# Patient Record
Sex: FEMALE | Race: BLACK OR AFRICAN AMERICAN | Employment: UNEMPLOYED | URBAN - METROPOLITAN AREA
[De-identification: names, ages, dates, MRNs, and addresses within clinical notes are randomized per-mention and may not be internally consistent; named-entity substitution may affect disease eponyms.]

---

## 2017-02-10 ENCOUNTER — PATIENT MESSAGE (OUTPATIENT)
Dept: OBGYN CLINIC | Age: 27
End: 2017-02-10

## 2017-03-31 ENCOUNTER — OFFICE VISIT (OUTPATIENT)
Dept: INTERNAL MEDICINE CLINIC | Age: 27
End: 2017-03-31

## 2017-03-31 VITALS
RESPIRATION RATE: 18 BRPM | SYSTOLIC BLOOD PRESSURE: 137 MMHG | TEMPERATURE: 98.9 F | BODY MASS INDEX: 40.37 KG/M2 | DIASTOLIC BLOOD PRESSURE: 76 MMHG | WEIGHT: 236.5 LBS | OXYGEN SATURATION: 100 % | HEART RATE: 85 BPM | HEIGHT: 64 IN

## 2017-03-31 DIAGNOSIS — R73.03 PREDIABETES: ICD-10-CM

## 2017-03-31 DIAGNOSIS — R09.81 NASAL CONGESTION: ICD-10-CM

## 2017-03-31 DIAGNOSIS — R03.0 ELEVATED BLOOD PRESSURE (NOT HYPERTENSION): ICD-10-CM

## 2017-03-31 DIAGNOSIS — R05.8 PRODUCTIVE COUGH: ICD-10-CM

## 2017-03-31 DIAGNOSIS — E66.01 OBESITY, MORBID, BMI 40.0-49.9 (HCC): ICD-10-CM

## 2017-03-31 DIAGNOSIS — R10.31 BILATERAL LOWER ABDOMINAL DISCOMFORT: ICD-10-CM

## 2017-03-31 DIAGNOSIS — J06.9 URI, ACUTE: Primary | ICD-10-CM

## 2017-03-31 DIAGNOSIS — R10.32 BILATERAL LOWER ABDOMINAL DISCOMFORT: ICD-10-CM

## 2017-03-31 DIAGNOSIS — R31.9 HEMATURIA: ICD-10-CM

## 2017-03-31 DIAGNOSIS — R04.0 MILD EPISTAXIS: ICD-10-CM

## 2017-03-31 DIAGNOSIS — Z30.018 ENCOUNTER FOR INITIAL PRESCRIPTION OF OTHER CONTRACEPTIVES: ICD-10-CM

## 2017-03-31 LAB
BILIRUB UR QL STRIP: NEGATIVE
GLUCOSE UR-MCNC: NEGATIVE MG/DL
HCG URINE, QL. (POC): NEGATIVE
KETONES P FAST UR STRIP-MCNC: NEGATIVE MG/DL
PH UR STRIP: 6.5 [PH] (ref 4.6–8)
PROT UR QL STRIP: NEGATIVE MG/DL
SP GR UR STRIP: 1.03 (ref 1–1.03)
UA UROBILINOGEN AMB POC: NORMAL (ref 0.2–1)
URINALYSIS CLARITY POC: CLEAR
URINALYSIS COLOR POC: NORMAL
URINE BLOOD POC: NORMAL
URINE LEUKOCYTES POC: NEGATIVE
URINE NITRITES POC: NEGATIVE
VALID INTERNAL CONTROL?: YES

## 2017-03-31 RX ORDER — PREDNISONE 10 MG/1
20 TABLET ORAL
Qty: 10 TAB | Refills: 0 | Status: SHIPPED | OUTPATIENT
Start: 2017-03-31 | End: 2017-07-13 | Stop reason: ALTCHOICE

## 2017-03-31 RX ORDER — ALBUTEROL SULFATE 0.63 MG/3ML
0.63 SOLUTION RESPIRATORY (INHALATION)
Qty: 1 VIAL | Refills: 0
Start: 2017-03-31 | End: 2017-03-31

## 2017-03-31 RX ORDER — ALBUTEROL SULFATE 90 UG/1
1 AEROSOL, METERED RESPIRATORY (INHALATION)
Qty: 1 INHALER | Refills: 0 | Status: SHIPPED | OUTPATIENT
Start: 2017-03-31

## 2017-03-31 NOTE — PROGRESS NOTES
Chief Complaint   Patient presents with    Hypertension     (RM5)    Cough    Sore Throat           Subjective:   Ernesto MontanoReggie 32 y.o.  female with a  past medical history reviewed see below. comes in today f/up HTN   C/o cough and st. cough dry/wet +/- yellow phelgm leads to a st and no cp with coughing  Had a baby since the last ov  C/o nose bleed started  with the could about a week ago and having a mild ha   No fever or chills. No back pain c/o lower abd pain last week no dyrusria no abd now   Trying to concieve       ROS: otherwise feeling generally well. All other systems reviewed and are negative      Current Outpatient Prescriptions   Medication Sig Dispense Refill    traMADol-acetaminophen (ULTRACET) 37.5-325 mg per tablet Take 1 Tab by mouth every six (6) hours as needed for Pain. Max Daily Amount: 4 Tabs. 12 Tab 0    ethinyl estradiol-etonogestrel (NUVARING) 0.12-0.015 mg/24 hr vaginal ring Place in vagina x 4 weeks, remove and replace the same day with new ring 1 Device 12    prenatal vit-calcium-iron-fa (PRENATAL PLUS WITH CALCIUM) 27 mg iron- 1 mg tab Take 1 Tab by mouth daily. Allergies   Allergen Reactions    Tomato Anaphylaxis    [place] Flavor Hives    Ibuprofen Other (comments)     Pt states med made her chest hurt.     Seafood [Shellfish Containing Products] Hives     Past Medical History:   Diagnosis Date    Anemia since birth    Headache     migraines    Hypertension since birth     Past Surgical History:   Procedure Laterality Date    HX  SECTION       Family History   Problem Relation Age of Onset   [address] [facility] Hypertension Mother     Diabetes Mother     Hypertension Sister     Hypertension Maternal Grandmother      Social History   Substance Use Topics    Smoking status: Never Smoker    Smokeless tobacco: Never Used    Alcohol use No          Objective:     Visit Vitals    /76 (BP 1 Location: Left arm, BP Patient Position: Sitting)    Pulse 85    Temp 98.9 °F (37.2 °C) (Oral)    Resp 18    Ht 5' 4\" (1.626 m)    Wt 236 lb 8 oz (107.3 kg)    LMP 03/14/2017    SpO2 100%    BMI 40.6 kg/m2     Gen: NAD, pleasant  HEENT: normal appearing head, nares patent, PERRLA, EOMI, oropharynx no erythema, no cervical lymphadenopathy neck supple   Cardio: RRR nl S1S2 no murmur  Lungs cough wet/dry sounding not productive  On exam  diminished breath sounds  wheeze no rales no rhonchi  ABD Soft no suprapubic or cva  Tenderness no guarding no rebounding limited by girth but no hsm  non distended + bowel sounds  Extremities: full ROM X 4 no clubbing no cyanosis  Neuro: no gross focal deficits noted, alert and orientated X 3  Psych.: well groomed no outward signs of depression. Assessment/Plan:   Shelbie Callejas was seen today for hypertension, cough and sore throat. Diagnoses and all orders for this visit:    URI, acute  -     albuterol (ACCUNEB) 0.63 mg/3 mL nebulizer solution; 3 mL by Nebulization route now for 1 dose.  -     HEMOGLOBIN A1C WITH EAG  -     LIPID PANEL  -     CBC WITH AUTOMATED DIFF  -     METABOLIC PANEL, COMPREHENSIVE    Elevated blood pressure (not hypertension)    Prediabetes    Obesity, morbid, BMI 40.0-49.9 ([facility] [address]    Encounter for initial prescription of other contraceptives    Bilateral lower abdominal discomfort  -     AMB POC URINALYSIS DIP STICK AUTO W/O MICRO    Nasal congestion  -     AMB POC URINE PREGNANCY TEST, VISUAL COLOR COMPARISON  -     albuterol (ACCUNEB) 0.63 mg/3 mL nebulizer solution; 3 mL by Nebulization route now for 1 dose. Productive cough  -     albuterol (ACCUNEB) 0.63 mg/3 mL nebulizer solution; 3 mL by Nebulization route now for 1 dose. -     B PERTUSSIS AB, IGG, IGM, IGA    Mild epistaxis    Hematuria  -     MICROSCOPIC EXAMINATION  -     CULTURE, URINE    Other orders  -     predniSONE (DELTASONE) 10 mg tablet; Take 2 Tabs by mouth daily (with breakfast).   -     albuterol (PROVENTIL HFA, VENTOLIN HFA, PROAIR HFA) 90 mcg/actuation inhaler; Take 1 Puff by inhalation every six (6) hours as needed for Wheezing. Follow-up Disposition:  Return for 1-2 weeks review labs . .  avs printed and given to the pt. .    The patient voiced understanding of the above. Medication side effects were reviewed with the patient. Call with any concerns.

## 2017-03-31 NOTE — PROGRESS NOTES
Chief Complaint   Patient presents with    Hypertension     (RM5)    Cough    Sore Throat     Albuterol Sulfate 0.083%  2.5 mg/3 ml Nebulizer Treatment provided.   Lot: [id number]  Exp. 8/17

## 2017-03-31 NOTE — MR AVS SNAPSHOT
Visit Information Date & Time Provider Department Dept. Phone Encounter #  
 3/31/2017 10:45 AM Alexander FletcherKandis [id number] Follow-up Instructions Return for 1-2 weeks review labs . Upcoming Health Maintenance Date Due  
 HPV AGE 9Y-34Y (1 of 3 - Female 3 Dose Series) 9/2/2001 INFLUENZA AGE 9 TO ADULT 8/1/2016 PAP AKA CERVICAL CYTOLOGY 4/14/2018 DTaP/Tdap/Td series (2 - Td) 1/11/2026 Allergies as of 3/31/2017  Review Complete On: 3/31/2017 By: Bindu Perkins Severity Noted Reaction Type Reactions Tomato High 07/29/2011   Systemic Anaphylaxis [place] Flavor  06/27/2011    Hives Ibuprofen  02/22/2016    Other (comments) Pt states med made her chest hurt. Seafood [Shellfish Containing Products]  07/29/2011    Hives Current Immunizations  Reviewed on 3/31/2017 Name Date Influenza Vaccine 11/9/2015 MMR 4/14/2015  9:44 AM  
 Tdap 1/11/2016 10:45 AM  
  
 Reviewed by Alexander Fletcher MD on 3/31/2017 at 11:14 AM  
You Were Diagnosed With   
  
 Codes Comments URI, acute    -  Primary ICD-10-CM: J06.9 ICD-9-CM: 465.9 Elevated blood pressure (not hypertension)     ICD-10-CM: R03.0 ICD-9-CM: 796.2 Prediabetes     ICD-10-CM: R73.03 
ICD-9-CM: 790.29 Obesity, morbid, BMI 40.0-49.9 (HCC)     ICD-10-CM: E66.01 
ICD-9-CM: 278.01 Encounter for initial prescription of other contraceptives     ICD-10-CM: F16.845 
ICD-9-CM: V25.02 Bilateral lower abdominal discomfort     ICD-10-CM: R10.31, R10.32 
ICD-9-CM: 789.03, 789.04 Nasal congestion     ICD-10-CM: R09.81 ICD-9-CM: 478.19 Productive cough     ICD-10-CM: R05 ICD-9-CM: 786.2 Mild epistaxis     ICD-10-CM: R04.0 ICD-9-CM: 304. 7 Vitals  BP Pulse Temp Resp Height(growth percentile) Weight(growth percentile)  
 137/76 (BP 1 Location: Left arm, BP Patient Position: Sitting) 85 98.9 °F (37.2 °C) (Oral) 18 5' 4\" (1.626 m) 236 lb 8 oz (107.3 kg) LMP SpO2 PF BMI OB Status Smoking Status 03/14/2017 100% 275 L/min 40.6 kg/m2 Having regular periods Never Smoker Vitals History BMI and BSA Data Body Mass Index Body Surface Area  
 40.6 kg/m 2 2.2 m 2 Preferred Pharmacy Pharmacy Name Phone [facility] [address] [facility] [phone] Your Updated Medication List  
  
   
This list is accurate as of: 3/31/17 12:00 PM.  Always use your most recent med list.  
  
  
  
  
 * albuterol 0.63 mg/3 mL nebulizer solution Commonly known as:  ACCUNEB  
3 mL by Nebulization route now for 1 dose. * albuterol 90 mcg/actuation inhaler Commonly known as:  PROVENTIL HFA, VENTOLIN HFA, PROAIR HFA Take 1 Puff by inhalation every six (6) hours as needed for Wheezing. predniSONE 10 mg tablet Commonly known as:  Reino Shoemaker Take 2 Tabs by mouth daily (with breakfast). * Notice: This list has 2 medication(s) that are the same as other medications prescribed for you. Read the directions carefully, and ask your doctor or other care provider to review them with you. Prescriptions Sent to Pharmacy Refills  
 predniSONE (DELTASONE) 10 mg tablet 0 Sig: Take 2 Tabs by mouth daily (with breakfast). Class: Normal  
 Pharmacy: [address] [place] [address] Ph #: [phone] Route: Oral  
 albuterol (PROVENTIL HFA, VENTOLIN HFA, PROAIR HFA) 90 mcg/actuation inhaler 0 Sig: Take 1 Puff by inhalation every six (6) hours as needed for Wheezing. Class: Normal  
 Pharmacy: [address] [place] [address] Ph #: [phone] Route: Inhalation We Performed the Following AMB POC URINALYSIS DIP STICK AUTO W/O MICRO [06946 CPT(R)] AMB POC URINE PREGNANCY TEST, VISUAL COLOR COMPARISON [87411 CPT(R)] CBC WITH AUTOMATED DIFF [26736 CPT(R)] HEMOGLOBIN A1C WITH EAG [69664 CPT(R)] LIPID PANEL [91374 CPT(R)] METABOLIC PANEL, COMPREHENSIVE [34355 CPT(R)] Follow-up Instructions Return for 1-2 weeks review labs . Patient Instructions Call if not better on the steroids in the nest 48 hrs Introducing [facility] & [facility]! Dear Faizan Alba: Thank you for requesting a [employer] account. Our records indicate that you already have an active [employer] account. You can access your account anytime at [email or web address] Did you know that you can access your hospital and ER discharge instructions at any time in [employer]? You can also review all of your test results from your hospital stay or ER visit. Additional Information If you have questions, please visit the Frequently Asked Questions section of the [employer] website at [email or web address] Remember, [employer] is NOT to be used for urgent needs. For medical emergencies, dial 911. Now available from your iPhone and Android! Please provide this summary of care documentation to your next provider. Your primary care clinician is listed as Leesa Gill. If you have any questions after today's visit, please call [phone].

## 2017-04-03 LAB
ALBUMIN SERPL-MCNC: 3.9 G/DL (ref 3.5–5.5)
ALBUMIN/GLOB SERPL: 1.1 {RATIO} (ref 1.2–2.2)
ALP SERPL-CCNC: 89 IU/L (ref 39–117)
ALT SERPL-CCNC: 17 IU/L (ref 0–32)
AST SERPL-CCNC: 16 IU/L (ref 0–40)
B PERT IGA SER QL IA: 1.1 INDEX (ref 0–0.9)
B PERT IGG SER-ACNC: 4.77 INDEX (ref 0–0.94)
B PERT IGM SER QL IA: 1.2 INDEX (ref 0–0.9)
BASOPHILS # BLD AUTO: 0 X10E3/UL (ref 0–0.2)
BASOPHILS NFR BLD AUTO: 0 %
BILIRUB SERPL-MCNC: <0.2 MG/DL (ref 0–1.2)
BUN SERPL-MCNC: 12 MG/DL (ref 6–20)
BUN/CREAT SERPL: 16 (ref 8–20)
CALCIUM SERPL-MCNC: 9.4 MG/DL (ref 8.7–10.2)
CHLORIDE SERPL-SCNC: 102 MMOL/L (ref 96–106)
CHOLEST SERPL-MCNC: 141 MG/DL (ref 100–199)
CO2 SERPL-SCNC: 23 MMOL/L (ref 18–29)
CREAT SERPL-MCNC: 0.73 MG/DL (ref 0.57–1)
EOSINOPHIL # BLD AUTO: 0.2 X10E3/UL (ref 0–0.4)
EOSINOPHIL NFR BLD AUTO: 3 %
ERYTHROCYTE [DISTWIDTH] IN BLOOD BY AUTOMATED COUNT: 14.7 % (ref 12.3–15.4)
EST. AVERAGE GLUCOSE BLD GHB EST-MCNC: 126 MG/DL
GLOBULIN SER CALC-MCNC: 3.4 G/DL (ref 1.5–4.5)
GLUCOSE SERPL-MCNC: 86 MG/DL (ref 65–99)
HBA1C MFR BLD: 6 % (ref 4.8–5.6)
HCT VFR BLD AUTO: 37.2 % (ref 34–46.6)
HDLC SERPL-MCNC: 37 MG/DL
HGB BLD-MCNC: 12 G/DL (ref 11.1–15.9)
IMM GRANULOCYTES # BLD: 0 X10E3/UL (ref 0–0.1)
IMM GRANULOCYTES NFR BLD: 0 %
INTERPRETATION, 910389: NORMAL
LDLC SERPL CALC-MCNC: 87 MG/DL (ref 0–99)
LYMPHOCYTES # BLD AUTO: 3.2 X10E3/UL (ref 0.7–3.1)
LYMPHOCYTES NFR BLD AUTO: 44 %
MCH RBC QN AUTO: 26.3 PG (ref 26.6–33)
MCHC RBC AUTO-ENTMCNC: 32.3 G/DL (ref 31.5–35.7)
MCV RBC AUTO: 82 FL (ref 79–97)
MONOCYTES # BLD AUTO: 0.6 X10E3/UL (ref 0.1–0.9)
MONOCYTES NFR BLD AUTO: 8 %
NEUTROPHILS # BLD AUTO: 3.2 X10E3/UL (ref 1.4–7)
NEUTROPHILS NFR BLD AUTO: 45 %
PLATELET # BLD AUTO: 482 X10E3/UL (ref 150–379)
POTASSIUM SERPL-SCNC: 4.4 MMOL/L (ref 3.5–5.2)
PROT SERPL-MCNC: 7.3 G/DL (ref 6–8.5)
RBC # BLD AUTO: 4.56 X10E6/UL (ref 3.77–5.28)
SODIUM SERPL-SCNC: 141 MMOL/L (ref 134–144)
TRIGL SERPL-MCNC: 83 MG/DL (ref 0–149)
VLDLC SERPL CALC-MCNC: 17 MG/DL (ref 5–40)
WBC # BLD AUTO: 7.1 X10E3/UL (ref 3.4–10.8)

## 2017-04-13 ENCOUNTER — OFFICE VISIT (OUTPATIENT)
Dept: INTERNAL MEDICINE CLINIC | Age: 27
End: 2017-04-13

## 2017-04-13 VITALS
BODY MASS INDEX: 40.74 KG/M2 | HEART RATE: 91 BPM | OXYGEN SATURATION: 99 % | RESPIRATION RATE: 18 BRPM | TEMPERATURE: 99.4 F | WEIGHT: 238.6 LBS | HEIGHT: 64 IN | DIASTOLIC BLOOD PRESSURE: 76 MMHG | SYSTOLIC BLOOD PRESSURE: 147 MMHG

## 2017-04-13 DIAGNOSIS — R73.02 IGT (IMPAIRED GLUCOSE TOLERANCE): ICD-10-CM

## 2017-04-13 DIAGNOSIS — Z71.2 ENCOUNTER TO DISCUSS TEST RESULTS: ICD-10-CM

## 2017-04-13 DIAGNOSIS — R05.9 COUGHING: ICD-10-CM

## 2017-04-13 DIAGNOSIS — E28.2 PCOS (POLYCYSTIC OVARIAN SYNDROME): ICD-10-CM

## 2017-04-13 DIAGNOSIS — A37.90 PERTUSSIS: Primary | ICD-10-CM

## 2017-04-13 DIAGNOSIS — Z71.3 DIETARY COUNSELING: ICD-10-CM

## 2017-04-13 RX ORDER — AZITHROMYCIN 250 MG/1
250 TABLET, FILM COATED ORAL SEE ADMIN INSTRUCTIONS
Qty: 6 TAB | Refills: 0 | Status: SHIPPED | OUTPATIENT
Start: 2017-04-13 | End: 2017-04-18

## 2017-04-13 RX ORDER — METFORMIN HYDROCHLORIDE 500 MG/1
500 TABLET ORAL 2 TIMES DAILY WITH MEALS
Qty: 30 TAB | Refills: 1 | Status: SHIPPED | OUTPATIENT
Start: 2017-04-13 | End: 2017-07-13 | Stop reason: SDUPTHER

## 2017-04-13 NOTE — PROGRESS NOTES
Chief Complaint   Patient presents with    Results     (RM7)           Subjective:   Dajuan Lux 32 y.o.  female with a  past medical history reviewed see below. Here for recheck of labs   Finished prednsione 4-5 days ago it helped quite a bit and no longer coughing in the day but still at night she has been not rellaly needing MDI using it every other day    ROS: otherwise feeling generally well. All other systems reviewed and are negative      Current Outpatient Prescriptions   Medication Sig Dispense Refill    albuterol (PROVENTIL HFA, VENTOLIN HFA, PROAIR HFA) 90 mcg/actuation inhaler Take 1 Puff by inhalation every six (6) hours as needed for Wheezing. 1 Inhaler 0    predniSONE (DELTASONE) 10 mg tablet Take 2 Tabs by mouth daily (with breakfast). 10 Tab 0     Allergies   Allergen Reactions    Tomato Anaphylaxis    [place] Flavor Hives    Ibuprofen Other (comments)     Pt states med made her chest hurt.     Seafood [Shellfish Containing Products] Hives     Past Medical History:   Diagnosis Date    Anemia since birth    Headache     migraines    Hypertension since birth     Past Surgical History:   Procedure Laterality Date    HX  SECTION       Family History   Problem Relation Age of Onset    Hypertension Mother     Diabetes Mother     Hypertension Sister     Hypertension Maternal Grandmother     No Known Problems Daughter      Social History   Substance Use Topics    Smoking status: Never Smoker    Smokeless tobacco: Never Used    Alcohol use No          Objective:     Visit Vitals    /76 (BP 1 Location: Right arm, BP Patient Position: Sitting)    Pulse 91    Temp 99.4 °F (37.4 °C) (Oral)    Resp 18    Ht 5' 4\" (1.626 m)    Wt 238 lb 9.6 oz (108.2 kg)    LMP 2017    SpO2 99%    BMI 40.96 kg/m2     Gen: NAD, pleasant  HEENT: normal appearing head, nares patent, PERRLA, EOMI, oropharynx no erythema, no cervical lymphadenopathy neck supple   Cardio: RRR nl S1S2 no murmur  Lungs CTAB no wheeze no rales no rhonchi  ABD Soft non tender non distended + bowel sounds  Extremities: full ROM X 4 no clubbing no cyanosis  Neuro: no gross focal deficits noted, alert and orientated X 3  Psych.: well groomed no outward signs of depression. Assessment/Plan:   Charles Scott was seen today for results. Diagnoses and all orders for this visit:    Pertussis    Coughing    Dietary counseling    PCOS (polycystic ovarian syndrome)  -     metFORMIN (GLUCOPHAGE) 500 mg tablet; Take 1 Tab by mouth two (2) times daily (with meals). IGT (impaired glucose tolerance)  -     metFORMIN (GLUCOPHAGE) 500 mg tablet; Take 1 Tab by mouth two (2) times daily (with meals). Encounter to discuss test results    Other orders  -     azithromycin (ZITHROMAX) 250 mg tablet; Take 1 Tab by mouth See Admin Instructions for 5 days. Follow-up Disposition:  Return in about 1 month (around 5/13/2017) for recheck pertussis 15 min please . .  avs printed and given to the pt. .  Verbal consent given for text messaging and number verified with pt. & Sent text message     The patient voiced understanding of the above. Medication side effects were reviewed with the patient. Call with any concerns.

## 2017-04-13 NOTE — MR AVS SNAPSHOT
Visit Information Date & Time Provider Department Dept. Phone Encounter #  
 4/13/2017 12:00 PM [facility] mandel [phone] [id number] Follow-up Instructions Return in about 1 month (around 5/13/2017) for recheck pertussis 15 min please . Upcoming Health Maintenance Date Due  
 HPV AGE 9Y-34Y (2 of 3 - Female 3 Dose Series) 6/8/2017 PAP AKA CERVICAL CYTOLOGY 4/14/2018 DTaP/Tdap/Td series (2 - Td) 1/11/2026 Allergies as of 4/13/2017  Review Complete On: 4/13/2017 By: Marizol Huang Severity Noted Reaction Type Reactions Tomato High 07/29/2011   Systemic Anaphylaxis [place] Flavor  06/27/2011    Hives Ibuprofen  02/22/2016    Other (comments) Pt states med made her chest hurt. Seafood [Shellfish Containing Products]  07/29/2011    Hives Current Immunizations  Reviewed on 4/13/2017 Name Date Influenza Vaccine 11/9/2015 MMR 4/14/2015  9:44 AM  
 Tdap 1/11/2016 10:45 AM  
  
 Reviewed by [facility], MD on 4/13/2017 at 12:47 PM  
You Were Diagnosed With   
  
 Codes Comments Pertussis    -  Primary ICD-10-CM: A37.90 ICD-9-CM: 033.9 Coughing     ICD-10-CM: D71 ICD-9-CM: 786.2 Dietary counseling     ICD-10-CM: Z71.3 ICD-9-CM: V65.3 Vitals BP Pulse Temp Resp Height(growth percentile) Weight(growth percentile) 147/76 (BP 1 Location: Right arm, BP Patient Position: Sitting) 91 99.4 °F (37.4 °C) (Oral) 18 5' 4\" (1.626 m) 238 lb 9.6 oz (108.2 kg) LMP SpO2 BMI OB Status Smoking Status 03/14/2017 99% 40.96 kg/m2 Having regular periods Never Smoker BMI and BSA Data Body Mass Index Body Surface Area 40.96 kg/m 2 2.21 m 2 Preferred Pharmacy Pharmacy Name Phone [facility] [address] [facility] [address] [facility] [place] [phone] Your Updated Medication List  
  
   
 This list is accurate as of: 17 12:52 PM.  Always use your most recent med list.  
  
  
  
  
 albuterol 90 mcg/actuation inhaler Commonly known as:  PROVENTIL HFA, VENTOLIN HFA, PROAIR HFA Take 1 Puff by inhalation every six (6) hours as needed for Wheezing. azithromycin 250 mg tablet Commonly known as:  Ancel Sly Take 1 Tab by mouth See Admin Instructions for 5 days. predniSONE 10 mg tablet Commonly known as:  Laqueta Maw Take 2 Tabs by mouth daily (with breakfast). Prescriptions Sent to Pharmacy Refills  
 azithromycin (ZITHROMAX) 250 mg tablet 0 Sig: Take 1 Tab by mouth See Admin Instructions for 5 days. Class: Normal  
 Pharmacy: [address] [place] [address] #: [phone] Route: Oral  
  
Follow-up Instructions Return in about 1 month (around 2017) for recheck pertussis 15 min please . Patient Instructions [employer] Activation Thank you for requesting access to [email or web address]. Please follow the instructions below to securely access and download your online medical record. [employer] allows you to send messages to your doctor, view your test results, renew your prescriptions, schedule appointments, and more. How Do I Sign Up? 1. In your internet browser, go to [email or web address] 
2. Click on the First Time User? Click Here link in the Sign In box. You will be redirect to the New Member Sign Up page. 3. Enter your [employer] Access Code exactly as it appears below. You will not need to use this code after youve completed the sign-up process. If you do not sign up before the expiration date, you must request a new code. [employer] Access Code: [email or web address] Expires: 2017 12:48 PM (This is the date your [employer] access code will ) 4. Enter the last four digits of your Social Security Number (xxxx) and Date of Birth (mm/dd/yyyy) as indicated and click Submit.  You will be taken to the next sign-up page. 5. Create a [employer] Servic[employer] ID. This will be your [employer] login ID and cannot be changed, so think of one that is secure and easy to remember. 6. Create a [employer] password. You can change your password at any time. 7. Enter your Password Reset Question and Answer. This can be used at a later time if you forget your password. 8. Enter your e-mail address. You will receive e-mail notification when new information is available in [address] 9. Click Sign Up. You can now view and download portions of your medical record. 10. Click the Download Summary menu link to download a portable copy of your medical information. Additional Information If you have questions, please visit the Frequently Asked Questions section of the [employer] website at https://"[employer]. AlertMe/Mob[employer] Software Inc.t/. Remember, [employer] is NOT to be used for urgent needs. For medical emergencies, dial 911. Introducing [facility] & HEALTH SERVICES! Nichole Bradley introduces [employer] patient portal. Now you can access parts of your medical record, email your doctor's office, and request medication refills online. 1. In your internet browser, go to https://"[employer]. AlertMe/[employer].t 2. Click on the First Time User? Click Here link in the Sign In box. You will see the New Member Sign Up page. 3. Enter your [employer] Access Code exactly as it appears below. You will not need to use this code after youve completed the sign-up process. If you do not sign up before the expiration date, you must request a new code. · [employer] Access Code: [email or web address] Expires: 7/12/2017 12:48 PM 
 
4. Enter the last four digits of your Social Security Number (xxxx) and Date of Birth (mm/dd/yyyy) as indicated and click Submit. You will be taken to the next sign-up page. 5. Create a [employer] Servicest ID. This will be your [employer] login ID and cannot be changed, so think of one that is secure and easy to remember. 6. Create a Discoverables password. You can change your password at any time. 7. Enter your Password Reset Question and Answer. This can be used at a later time if you forget your password. 8. Enter your e-mail address. You will receive e-mail notification when new information is available in [address] 9. Click Sign Up. You can now view and download portions of your medical record. 10. Click the Download Summary menu link to download a portable copy of your medical information. If you have questions, please visit the Frequently Asked Questions section of the [employer] website. Remember, [employer] is NOT to be used for urgent needs. For medical emergencies, dial 911. Now available from your iPhone and Android! Please provide this summary of care documentation to your next provider. Your primary care clinician is listed as Barbara Catrer. If you have any questions after today's visit, please call [phone].

## 2017-04-13 NOTE — PATIENT INSTRUCTIONS
[employer] Activation    Thank you for requesting access to [employer]. Please follow the instructions below to securely access and download your online medical record. [employer] allows you to send messages to your doctor, view your test results, renew your prescriptions, schedule appointments, and more. How Do I Sign Up? 1. In your internet browser, go to [email or web address]  2. Click on the First Time User? Click Here link in the Sign In box. You will be redirect to the New Member Sign Up page. 3. Enter your [employer] Access Code exactly as it appears below. You will not need to use this code after youve completed the sign-up process. If you do not sign up before the expiration date, you must request a new code. [employer] Access Code: [email or web address]  Expires: 2017 12:48 PM (This is the date your [employer] access code will )    4. Enter the last four digits of your Social Security Number (xxxx) and Date of Birth (mm/dd/yyyy) as indicated and click Submit. You will be taken to the next sign-up page. 5. Create a [employer] ID. This will be your [employer] login ID and cannot be changed, so think of one that is secure and easy to remember. 6. Create a [employer] password. You can change your password at any time. 7. Enter your Password Reset Question and Answer. This can be used at a later time if you forget your password. 8. Enter your e-mail address. You will receive e-mail notification when new information is available in [address] 9. Click Sign Up. You can now view and download portions of your medical record. 10. Click the Download Summary menu link to download a portable copy of your medical information. Additional Information    If you have questions, please visit the Frequently Asked Questions section of the [employer] website at [email or web address] Remember, [employer] is NOT to be used for urgent needs. For medical emergencies, dial 911.

## 2017-05-08 ENCOUNTER — APPOINTMENT (OUTPATIENT)
Dept: ULTRASOUND IMAGING | Age: 27
End: 2017-05-08
Attending: PHYSICIAN ASSISTANT
Payer: SELF-PAY

## 2017-05-08 ENCOUNTER — HOSPITAL ENCOUNTER (EMERGENCY)
Age: 27
Discharge: HOME | End: 2017-05-08
Attending: EMERGENCY MEDICINE
Payer: SELF-PAY

## 2017-05-08 VITALS
OXYGEN SATURATION: 100 % | SYSTOLIC BLOOD PRESSURE: 130 MMHG | HEIGHT: 64 IN | RESPIRATION RATE: 16 BRPM | DIASTOLIC BLOOD PRESSURE: 89 MMHG | BODY MASS INDEX: 41.21 KG/M2 | HEART RATE: 89 BPM | WEIGHT: 241.38 LBS | TEMPERATURE: 98.6 F

## 2017-05-08 DIAGNOSIS — O20.0 THREATENED ABORTION: Primary | ICD-10-CM

## 2017-05-08 LAB
ALBUMIN SERPL BCP-MCNC: 3.4 G/DL (ref 3.5–5)
ALBUMIN/GLOB SERPL: 0.7 {RATIO} (ref 1.1–2.2)
ALP SERPL-CCNC: 84 U/L (ref 45–117)
ALT SERPL-CCNC: 20 U/L (ref 12–78)
ANION GAP BLD CALC-SCNC: 5 MMOL/L (ref 5–15)
APPEARANCE UR: CLEAR
AST SERPL W P-5'-P-CCNC: 19 U/L (ref 15–37)
BACTERIA URNS QL MICRO: NEGATIVE /HPF
BASOPHILS # BLD AUTO: 0 K/UL (ref 0–0.1)
BASOPHILS # BLD: 0 % (ref 0–1)
BILIRUB SERPL-MCNC: 0.3 MG/DL (ref 0.2–1)
BILIRUB UR QL: NEGATIVE
BUN SERPL-MCNC: 11 MG/DL (ref 6–20)
BUN/CREAT SERPL: 10 (ref 12–20)
CALCIUM SERPL-MCNC: 9.5 MG/DL (ref 8.5–10.1)
CHLORIDE SERPL-SCNC: 107 MMOL/L (ref 97–108)
CLUE CELLS VAG QL WET PREP: NORMAL
CO2 SERPL-SCNC: 27 MMOL/L (ref 21–32)
COLOR UR: ABNORMAL
CREAT SERPL-MCNC: 1.05 MG/DL (ref 0.55–1.02)
DIFFERENTIAL METHOD BLD: ABNORMAL
EOSINOPHIL # BLD: 0.3 K/UL (ref 0–0.4)
EOSINOPHIL NFR BLD: 5 % (ref 0–7)
EPITH CASTS URNS QL MICRO: ABNORMAL /LPF
ERYTHROCYTE [DISTWIDTH] IN BLOOD BY AUTOMATED COUNT: 13.3 % (ref 11.5–14.5)
GLOBULIN SER CALC-MCNC: 4.8 G/DL (ref 2–4)
GLUCOSE SERPL-MCNC: 87 MG/DL (ref 65–100)
GLUCOSE UR STRIP.AUTO-MCNC: NEGATIVE MG/DL
HCG SERPL-ACNC: 1057 MIU/ML (ref 0–6)
HCT VFR BLD AUTO: 36.4 % (ref 35–47)
HGB BLD-MCNC: 11.9 G/DL (ref 11.5–16)
HGB UR QL STRIP: ABNORMAL
HYALINE CASTS URNS QL MICRO: ABNORMAL /LPF (ref 0–5)
KETONES UR QL STRIP.AUTO: NEGATIVE MG/DL
KOH PREP SPEC: NORMAL
LEUKOCYTE ESTERASE UR QL STRIP.AUTO: NEGATIVE
LYMPHOCYTES # BLD AUTO: 59 % (ref 12–49)
LYMPHOCYTES # BLD: 4 K/UL (ref 0.8–3.5)
MCH RBC QN AUTO: 26.7 PG (ref 26–34)
MCHC RBC AUTO-ENTMCNC: 32.7 G/DL (ref 30–36.5)
MCV RBC AUTO: 81.8 FL (ref 80–99)
MONOCYTES # BLD: 0.4 K/UL (ref 0–1)
MONOCYTES NFR BLD AUTO: 6 % (ref 5–13)
NEUTS SEG # BLD: 2 K/UL (ref 1.8–8)
NEUTS SEG NFR BLD AUTO: 30 % (ref 32–75)
NITRITE UR QL STRIP.AUTO: NEGATIVE
PH UR STRIP: 6.5 [PH] (ref 5–8)
PLATELET # BLD AUTO: 468 K/UL (ref 150–400)
POTASSIUM SERPL-SCNC: 4.2 MMOL/L (ref 3.5–5.1)
PROT SERPL-MCNC: 8.2 G/DL (ref 6.4–8.2)
PROT UR STRIP-MCNC: NEGATIVE MG/DL
RBC # BLD AUTO: 4.45 M/UL (ref 3.8–5.2)
RBC #/AREA URNS HPF: ABNORMAL /HPF (ref 0–5)
RBC MORPH BLD: ABNORMAL
SERVICE CMNT-IMP: NORMAL
SODIUM SERPL-SCNC: 139 MMOL/L (ref 136–145)
SP GR UR REFRACTOMETRY: 1.02 (ref 1–1.03)
T VAGINALIS VAG QL WET PREP: NORMAL
UROBILINOGEN UR QL STRIP.AUTO: 1 EU/DL (ref 0.2–1)
WBC # BLD AUTO: 6.7 K/UL (ref 3.6–11)
WBC URNS QL MICRO: ABNORMAL /HPF (ref 0–4)

## 2017-05-08 PROCEDURE — 80053 COMPREHEN METABOLIC PANEL: CPT | Performed by: EMERGENCY MEDICINE

## 2017-05-08 PROCEDURE — 85025 COMPLETE CBC W/AUTO DIFF WBC: CPT | Performed by: EMERGENCY MEDICINE

## 2017-05-08 PROCEDURE — 96360 HYDRATION IV INFUSION INIT: CPT

## 2017-05-08 PROCEDURE — 36415 COLL VENOUS BLD VENIPUNCTURE: CPT | Performed by: EMERGENCY MEDICINE

## 2017-05-08 PROCEDURE — 81001 URINALYSIS AUTO W/SCOPE: CPT | Performed by: PHYSICIAN ASSISTANT

## 2017-05-08 PROCEDURE — 76817 TRANSVAGINAL US OBSTETRIC: CPT

## 2017-05-08 PROCEDURE — 87210 SMEAR WET MOUNT SALINE/INK: CPT | Performed by: PHYSICIAN ASSISTANT

## 2017-05-08 PROCEDURE — 96361 HYDRATE IV INFUSION ADD-ON: CPT

## 2017-05-08 PROCEDURE — 76801 OB US < 14 WKS SINGLE FETUS: CPT

## 2017-05-08 PROCEDURE — 84702 CHORIONIC GONADOTROPIN TEST: CPT | Performed by: EMERGENCY MEDICINE

## 2017-05-08 PROCEDURE — 74011250636 HC RX REV CODE- 250/636: Performed by: PHYSICIAN ASSISTANT

## 2017-05-08 PROCEDURE — 99284 EMERGENCY DEPT VISIT MOD MDM: CPT

## 2017-05-08 PROCEDURE — 87491 CHLMYD TRACH DNA AMP PROBE: CPT | Performed by: PHYSICIAN ASSISTANT

## 2017-05-08 RX ADMIN — SODIUM CHLORIDE 1000 ML: 900 INJECTION, SOLUTION INTRAVENOUS at 15:36

## 2017-05-08 NOTE — ED NOTES
Patient discharged by PA. Results and discharge instructions reviewed with the patient by PA. Patient verbalizes understanding. Patient discharged home, stable, ambulatory, in no acute distress.

## 2017-05-08 NOTE — ED NOTES
Pt ambulated to bathroom with steady gait, pt changed into gown, side rails up x 1, call light placed within reach.

## 2017-05-08 NOTE — Clinical Note
- return for new or concerning symptoms; worsening pain, heavy bleeding, dizziness 
- follow up here, with your local health department, with [facility], or the OB/GYN (Dr. Zander Srivastava) on Wednesday to have your HCG re-checked; today it was 1000

## 2017-05-08 NOTE — DISCHARGE INSTRUCTIONS

## 2017-05-08 NOTE — ED PROVIDER NOTES
HPI Comments: 32 y.o. female with past medical history significant for anemia and HTN who presents accompanied by sister-in-law and daughter with chief complaint of vaginal bleeding. Pt is currently pregnant; she reports taking three home pregnancy tests last week, which were all positive (G:3 P:1). Pt notes h/o one possible miscarriage last year. Yesterday, pt began to experience mild vaginal bleeding, which became worse today and is now accompanied by abd cramping. Pt's cramping radiates diffusely across her lower abdomen. Additional sx includes nausea. LMP was 17 (20 days ago). Pt is not regularly followed by OB/GYN. Pt denies fever, vomiting dysuria, cough, sore throat, rhinorrhea, CP, or SOB. There are no other acute medical concerns at this time. PCP: [address] [facility], [place]    .Note written by Latricia Dean, as dictated by Jeanette Cortes PA-C 3:00 PM      The history is provided by the patient. No  was used.         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 HENT: Negative for congestion, rhinorrhea and sore throat. Respiratory: Negative for cough and shortness of breath. Cardiovascular: Negative for chest pain. Gastrointestinal: Positive for abdominal pain (\"cramping\") and nausea. Negative for vomiting. Genitourinary: Positive for vaginal bleeding. Negative for dysuria. All other systems reviewed and are negative. Vitals:    05/08/17 1416   BP: 152/89   Pulse: (!) 101   Resp: 16   Temp: 98.3 °F (36.8 °C)   SpO2: 100%   Weight: 109.5 kg (241 lb 6 oz)   Height: 5' 4\" (1.626 m)            Physical Exam   Constitutional: She is oriented to person, place, and time. She appears well-developed and well-nourished. No distress. Pleasant AA female   HENT:   Head: Normocephalic and atraumatic. Right Ear: External ear normal.   Left Ear: External ear normal.   Eyes: Conjunctivae are normal. No scleral icterus. Neck: Neck supple. No tracheal deviation present. Cardiovascular: Normal rate, regular rhythm and normal heart sounds. Exam reveals no gallop and no friction rub. No murmur heard. Pulmonary/Chest: Effort normal and breath sounds normal. No stridor. No respiratory distress. She has no wheezes. Abdominal: Soft. She exhibits no distension. There is no tenderness. There is no rebound and no guarding. Genitourinary:   Genitourinary Comments: Os closed  Scant blood in the vault   Musculoskeletal: Normal range of motion. Neurological: She is alert and oriented to person, place, and time. Skin: Skin is warm and dry. Psychiatric: She has a normal mood and affect. Her behavior is normal.   Nursing note and vitals reviewed. MDM  Number of Diagnoses or Management Options  Diagnosis management comments: 32year old female LMP 4/18 presenting for cramping and bleeding.  + home test.  HCG 1000. Os closed. No IUP on US. Discussed with pt various possibilities for bleeding this early in pregnancy (normal pregnant, ectopic, miscarriage). Ectopic return precautions given, pt instructed to f/u here or with GYN or with clinic in 48 hours for re-check of HCG, return precautions given.        Amount and/or Complexity of Data Reviewed  Tests in the radiology section of CPT®: ordered and reviewed  Discuss the patient with other providers: yes (Dr. Uli Platt, ED attending)      ED Course       Procedures

## 2017-05-08 NOTE — ED TRIAGE NOTES
Pt stated she thinks she is having a miscarriage, pt is 3 weeks pregnant and started spotting yesterday with increased amt of bleeding today, +cramps

## 2017-05-10 LAB
C TRACH DNA SPEC QL NAA+PROBE: NEGATIVE
N GONORRHOEA DNA SPEC QL NAA+PROBE: NEGATIVE
SAMPLE TYPE: NORMAL
SERVICE CMNT-IMP: NORMAL
SPECIMEN SOURCE: NORMAL

## 2017-07-13 ENCOUNTER — OFFICE VISIT (OUTPATIENT)
Dept: INTERNAL MEDICINE CLINIC | Age: 27
End: 2017-07-13

## 2017-07-13 VITALS
WEIGHT: 241.4 LBS | OXYGEN SATURATION: 99 % | BODY MASS INDEX: 41.21 KG/M2 | TEMPERATURE: 95.8 F | DIASTOLIC BLOOD PRESSURE: 82 MMHG | HEART RATE: 82 BPM | RESPIRATION RATE: 16 BRPM | HEIGHT: 64 IN | SYSTOLIC BLOOD PRESSURE: 142 MMHG

## 2017-07-13 DIAGNOSIS — I10 HTN, AGE 0-18: ICD-10-CM

## 2017-07-13 DIAGNOSIS — R73.02 IGT (IMPAIRED GLUCOSE TOLERANCE): ICD-10-CM

## 2017-07-13 DIAGNOSIS — E28.2 PCOS (POLYCYSTIC OVARIAN SYNDROME): ICD-10-CM

## 2017-07-13 DIAGNOSIS — O03.9 SAB (SPONTANEOUS ABORTION): Primary | ICD-10-CM

## 2017-07-13 DIAGNOSIS — E66.01 OBESITY, MORBID, BMI 40.0-49.9 (HCC): ICD-10-CM

## 2017-07-13 RX ORDER — METFORMIN HYDROCHLORIDE 500 MG/1
500 TABLET ORAL 2 TIMES DAILY WITH MEALS
Qty: 30 TAB | Refills: 1 | Status: SHIPPED | OUTPATIENT
Start: 2017-07-13 | End: 2017-08-03 | Stop reason: SDUPTHER

## 2017-07-13 NOTE — PROGRESS NOTES
Chief Complaint   Patient presents with   [address] [place] [address] Follow Up     ED visit with miscarriage           Subjective:   Deon CHRISTINE Maci 32 y.o.  female with a  past medical history reviewed see below. comes in today for f/up recently had an SAB and is here for f/up from the ER. ER note reviewed: NO IUP noted +vag bleeding bp was ok   The pregnancy was planned    She has not have any blood work since and denies sexual activity LMP 7--17. She did have cramping only with her period  and denies any spotting   She denies any denny's- today she is fasting and drank some h2o today. ROS: otherwise feeling generally well. All other systems reviewed and are negative      Current Outpatient Prescriptions   Medication Sig Dispense Refill    metFORMIN (GLUCOPHAGE) 500 mg tablet Take 1 Tab by mouth two (2) times daily (with meals). 30 Tab 1    prenatal vit-iron fumarate-fa 27 mg iron- 0.8 mg tab tablet Take 1 Tab by mouth daily. 30 Tab 3    albuterol (PROVENTIL HFA, VENTOLIN HFA, PROAIR HFA) 90 mcg/actuation inhaler Take 1 Puff by inhalation every six (6) hours as needed for Wheezing. 1 Inhaler 0     Allergies   Allergen Reactions    Tomato Anaphylaxis    [place] Flavor Hives    Ibuprofen Other (comments)     Pt states med made her chest hurt.     Seafood [Shellfish Containing Products] Hives     Past Medical History:   Diagnosis Date    Anemia since birth    Headache     migraines    Hypertension since birth     Past Surgical History:   Procedure Laterality Date    HX  SECTION       Family History   Problem Relation Age of Onset   [facility] Hypertension Mother     Diabetes Mother     Hypertension Sister     Hypertension Maternal Grandmother     No Known Problems Daughter      Social History   Substance Use Topics    Smoking status: Never Smoker    Smokeless tobacco: Never Used    Alcohol use No          Objective:     Visit Vitals    /82 (BP 1 Location: Left arm, BP Patient Position: Sitting)    Pulse 82    Temp 95.8 °F (35.4 °C) (Oral)    Resp 16    Ht 5' 4\" (1.626 m)    Wt 241 lb 6.4 oz (109.5 kg)    LMP 2017 (Exact Date)    SpO2 99%    Breastfeeding Yes    BMI 41.44 kg/m2     Gen: NAD, pleasant  HEENT: normal appearing head, nares patent, PERRLA, EOMI, oropharynx no erythema, no cervical lymphadenopathy neck supple   Cardio: RRR nl S1S2 no murmur  Lungs CTAB no wheeze no rales no rhonchi  ABD Soft non tender non distended + bowel sounds  Extremities: full ROM X 4 no clubbing no cyanosis  Neuro: no gross focal deficits noted, alert and orientated X 3  Psych.: well groomed +depression. see phq9       Assessment/Plan:   Diagnoses and all orders for this visit:    1. SAB (spontaneous )  -     TOTAL HCG, QT.  -     CBC WITH AUTOMATED DIFF  -     THYROID PANEL W/TSH  -     HEMOGLOBIN A1C WITH EAG  -     LIPID PANEL  -     URINALYSIS W/ RFLX MICROSCOPIC  -     CULTURE, URINE    2. PCOS (polycystic ovarian syndrome)  -     metFORMIN (GLUCOPHAGE) 500 mg tablet; Take 1 Tab by mouth two (2) times daily (with meals). -     TOTAL HCG, QT.  -     CBC WITH AUTOMATED DIFF  -     THYROID PANEL W/TSH  -     HEMOGLOBIN A1C WITH EAG  -     LIPID PANEL    3. IGT (impaired glucose tolerance)  -     metFORMIN (GLUCOPHAGE) 500 mg tablet; Take 1 Tab by mouth two (2) times daily (with meals). -     TOTAL HCG, QT.  -     CBC WITH AUTOMATED DIFF  -     THYROID PANEL W/TSH  -     HEMOGLOBIN A1C WITH EAG  -     LIPID PANEL    4. Obesity, morbid, BMI 40.0-49.9 (HCC)  -     TOTAL HCG, QT.  -     CBC WITH AUTOMATED DIFF  -     THYROID PANEL W/TSH  -     HEMOGLOBIN A1C WITH EAG  -     LIPID PANEL    5. HTN, age 0-24  -     TOTAL HCG, QT.  -     CBC WITH AUTOMATED DIFF  -     THYROID PANEL W/TSH  -     HEMOGLOBIN A1C WITH EAG  -     LIPID PANEL    Other orders  -     prenatal vit-iron fumarate-fa 27 mg iron- 0.8 mg tab tablet; Take 1 Tab by mouth daily.   -     CVD REPORT      Follow-up Disposition:  Return in about 2 weeks (around 7/27/2017) for review labs 15 min . Giselle Christian avs printed and given to the pt. .    The patient voiced understanding of the above. Medication side effects were reviewed with the patient. Call with any concerns.

## 2017-07-13 NOTE — MR AVS SNAPSHOT
Visit Information Date & Time Provider Department Dept. Phone Encounter #  
 2017 10:45  [facility], [address] [address] [place] [id number] Follow-up Instructions Return in about 2 weeks (around 2017) for review labs 15 min . Upcoming Health Maintenance Date Due  
 HPV AGE 9Y-34Y (2 of 3 - Female 3 Dose Series) 2017 INFLUENZA AGE 9 TO ADULT 2017 PAP AKA CERVICAL CYTOLOGY 2018 DTaP/Tdap/Td series (2 - Td) 2026 Allergies as of 2017  Review Complete On: 2017 By: [address] [facility] [place], [place]  
  
 Severity Noted Reaction Type Reactions Tomato High 2011   Systemic Anaphylaxis [place] Flavor  2011    Hives Ibuprofen  2016    Other (comments) Pt states med made her chest hurt. Seafood [Shellfish Containing Products]  2011    Hives Current Immunizations  Reviewed on 2017 Name Date Influenza Vaccine 2015 MMR 2015  9:44 AM  
 Tdap 2016 10:45 AM  
  
 Not reviewed this visit You Were Diagnosed With   
  
 Codes Comments SAB (spontaneous )    -  Primary ICD-10-CM: O03.9 ICD-9-CM: 634.90   
 PCOS (polycystic ovarian syndrome)     ICD-10-CM: E28.2 ICD-9-CM: 256.4 IGT (impaired glucose tolerance)     ICD-10-CM: R73.02 
ICD-9-CM: 790.22 Obesity, morbid, BMI 40.0-49.9 (HCC)     ICD-10-CM: E66.01 
ICD-9-CM: 278.01   
 HTN, age 0-24     ICD-10-CM: I10 
ICD-9-CM: 401.9 Vitals BP Pulse Temp Resp Height(growth percentile) Weight(growth percentile) 142/82 (BP 1 Location: Left arm, BP Patient Position: Sitting) 82 95.8 °F (35.4 °C) (Oral) 16 5' 4\" (1.626 m) 241 lb 6.4 oz (109.5 kg) LMP SpO2 Breastfeeding? BMI OB Status Smoking Status 2017 (Exact Date) 99% Yes 41.44 kg/m2 Having regular periods Never Smoker Vitals History BMI and BSA Data Body Mass Index Body Surface Area 41.44 kg/m 2 2.22 m 2 Preferred Pharmacy Pharmacy Name Phone [facility] [place] [id number] [facility] [phone] Your Updated Medication List  
  
   
This list is accurate as of: 7/13/17 11:04 AM.  Always use your most recent med list.  
  
  
  
  
 albuterol 90 mcg/actuation inhaler Commonly known as:  PROVENTIL HFA, VENTOLIN HFA, PROAIR HFA Take 1 Puff by inhalation every six (6) hours as needed for Wheezing. metFORMIN 500 mg tablet Commonly known as:  GLUCOPHAGE Take 1 Tab by mouth two (2) times daily (with meals). prenatal vit-iron fumarate-fa 27 mg iron- 0.8 mg Tab tablet Take 1 Tab by mouth daily. Prescriptions Sent to Pharmacy Refills  
 metFORMIN (GLUCOPHAGE) 500 mg tablet 1 Sig: Take 1 Tab by mouth two (2) times daily (with meals). Class: Normal  
 Pharmacy: [facility] [place] [id number] [facility] Ph #: [phone] Route: Oral  
 prenatal vit-iron fumarate-fa 27 mg iron- 0.8 mg tab tablet 3 Sig: Take 1 Tab by mouth daily. Class: Normal  
 Pharmacy: [facility] [place] [id number] [facility] Ph #: [phone] Route: Oral  
  
We Performed the Following CBC WITH AUTOMATED DIFF [22700 CPT(R)] HEMOGLOBIN A1C WITH EAG [12188 CPT(R)] LIPID PANEL [93403 CPT(R)] THYROID PANEL W/TSH [00589 CPT(R)] TOTAL HCG, QT. [68545 CPT(R)] Follow-up Instructions Return in about 2 weeks (around 7/27/2017) for review labs 15 min . Introducing [facility]! Dear Loan Valle: Thank you for requesting a [employer] account. Our records indicate that you already have an active [employer] account. You can access your account anytime at [email or web address] Did you know that you can access your hospital and ER discharge instructions at any time in [employer]? You can also review all of your test results from your hospital stay or ER visit. Additional Information If you have questions, please visit the Frequently Asked Questions section of the [employer] website at [email or web address] Remember, [employer] is NOT to be used for urgent needs. For medical emergencies, dial 911. Now available from your iPhone and Android! Please provide this summary of care documentation to your next provider. Your primary care clinician is listed as Joey Perdomo. If you have any questions after today's visit, please call [phone].

## 2017-07-13 NOTE — PROGRESS NOTES
Chief Complaint   Patient presents with   [address] Follow Up     ED visit with miscarriage     1. Have you been to the ER, urgent care clinic since your last visit? Hospitalized since your last visit? Yes When: 5/8/17 Reason for visit: miscarriage    2. Have you seen or consulted any other health care providers outside of the [address] [place] [address] since your last visit? Include any pap smears or colon screening.  No

## 2017-07-14 LAB
APPEARANCE UR: CLEAR
BASOPHILS # BLD AUTO: 0 X10E3/UL (ref 0–0.2)
BASOPHILS NFR BLD AUTO: 0 %
BILIRUB UR QL STRIP: NEGATIVE
CHOLEST SERPL-MCNC: 144 MG/DL (ref 100–199)
COLOR UR: YELLOW
EOSINOPHIL # BLD AUTO: 0.1 X10E3/UL (ref 0–0.4)
EOSINOPHIL NFR BLD AUTO: 2 %
ERYTHROCYTE [DISTWIDTH] IN BLOOD BY AUTOMATED COUNT: 14.3 % (ref 12.3–15.4)
EST. AVERAGE GLUCOSE BLD GHB EST-MCNC: 117 MG/DL
FT4I SERPL CALC-MCNC: 2 (ref 1.2–4.9)
GLUCOSE UR QL: NEGATIVE
HBA1C MFR BLD: 5.7 % (ref 4.8–5.6)
HCG INTACT+B SERPL-ACNC: <1 MIU/ML
HCT VFR BLD AUTO: 39.1 % (ref 34–46.6)
HDLC SERPL-MCNC: 38 MG/DL
HGB BLD-MCNC: 12.3 G/DL (ref 11.1–15.9)
HGB UR QL STRIP: NEGATIVE
IMM GRANULOCYTES # BLD: 0 X10E3/UL (ref 0–0.1)
IMM GRANULOCYTES NFR BLD: 0 %
INTERPRETATION, 910389: NORMAL
KETONES UR QL STRIP: NEGATIVE
LDLC SERPL CALC-MCNC: 88 MG/DL (ref 0–99)
LEUKOCYTE ESTERASE UR QL STRIP: NEGATIVE
LYMPHOCYTES # BLD AUTO: 3.1 X10E3/UL (ref 0.7–3.1)
LYMPHOCYTES NFR BLD AUTO: 58 %
MCH RBC QN AUTO: 26.5 PG (ref 26.6–33)
MCHC RBC AUTO-ENTMCNC: 31.5 G/DL (ref 31.5–35.7)
MCV RBC AUTO: 84 FL (ref 79–97)
MICRO URNS: NORMAL
MONOCYTES # BLD AUTO: 0.3 X10E3/UL (ref 0.1–0.9)
MONOCYTES NFR BLD AUTO: 5 %
NEUTROPHILS # BLD AUTO: 1.9 X10E3/UL (ref 1.4–7)
NEUTROPHILS NFR BLD AUTO: 35 %
NITRITE UR QL STRIP: NEGATIVE
PH UR STRIP: 7 [PH] (ref 5–7.5)
PLATELET # BLD AUTO: 428 X10E3/UL (ref 150–379)
PROT UR QL STRIP: NEGATIVE
RBC # BLD AUTO: 4.64 X10E6/UL (ref 3.77–5.28)
SP GR UR: 1.02 (ref 1–1.03)
T3RU NFR SERPL: 26 % (ref 24–39)
T4 SERPL-MCNC: 7.7 UG/DL (ref 4.5–12)
TRIGL SERPL-MCNC: 89 MG/DL (ref 0–149)
TSH SERPL DL<=0.005 MIU/L-ACNC: 2.93 UIU/ML (ref 0.45–4.5)
UROBILINOGEN UR STRIP-MCNC: 0.2 MG/DL (ref 0.2–1)
VLDLC SERPL CALC-MCNC: 18 MG/DL (ref 5–40)
WBC # BLD AUTO: 5.3 X10E3/UL (ref 3.4–10.8)

## 2017-07-15 LAB — BACTERIA UR CULT: NORMAL

## 2017-08-03 ENCOUNTER — OFFICE VISIT (OUTPATIENT)
Dept: INTERNAL MEDICINE CLINIC | Age: 27
End: 2017-08-03

## 2017-08-03 VITALS
WEIGHT: 242.4 LBS | HEIGHT: 64 IN | SYSTOLIC BLOOD PRESSURE: 134 MMHG | RESPIRATION RATE: 16 BRPM | DIASTOLIC BLOOD PRESSURE: 79 MMHG | TEMPERATURE: 98.6 F | HEART RATE: 81 BPM | BODY MASS INDEX: 41.38 KG/M2 | OXYGEN SATURATION: 98 %

## 2017-08-03 DIAGNOSIS — E28.2 PCOS (POLYCYSTIC OVARIAN SYNDROME): ICD-10-CM

## 2017-08-03 DIAGNOSIS — R73.02 IGT (IMPAIRED GLUCOSE TOLERANCE): ICD-10-CM

## 2017-08-03 DIAGNOSIS — E66.01 OBESITY, MORBID, BMI 40.0-49.9 (HCC): ICD-10-CM

## 2017-08-03 DIAGNOSIS — Z71.2 ENCOUNTER TO DISCUSS TEST RESULTS: ICD-10-CM

## 2017-08-03 DIAGNOSIS — E78.6 LOW HDL (UNDER 40): Primary | ICD-10-CM

## 2017-08-03 RX ORDER — METFORMIN HYDROCHLORIDE 500 MG/1
500 TABLET ORAL
Qty: 30 TAB | Refills: 6 | Status: ON HOLD | OUTPATIENT
Start: 2017-08-03 | End: 2019-05-06

## 2017-08-03 NOTE — PROGRESS NOTES
Chief Complaint   Patient presents with    Results           Subjective:   Nickie WilkinsonLatoya 32 y.o.  female with a  past medical history reviewed see below. comes in today c/o: Here for test results denies any other concerns trying to increase cardio exercise and diet is slowly improving     ROS: otherwise feeling generally well. All other systems reviewed and are negative      Current Outpatient Prescriptions   Medication Sig Dispense Refill    metFORMIN (GLUCOPHAGE) 500 mg tablet Take 1 Tab by mouth two (2) times daily (with meals). 30 Tab 1    prenatal vit-iron fumarate-fa 27 mg iron- 0.8 mg tab tablet Take 1 Tab by mouth daily. 30 Tab 3    albuterol (PROVENTIL HFA, VENTOLIN HFA, PROAIR HFA) 90 mcg/actuation inhaler Take 1 Puff by inhalation every six (6) hours as needed for Wheezing. 1 Inhaler 0     Allergies   Allergen Reactions    Tomato Anaphylaxis    [place] Flavor Hives    Ibuprofen Other (comments)     Pt states med made her chest hurt.     Seafood [Shellfish Containing Products] Hives     Past Medical History:   Diagnosis Date    Anemia since birth    Headache     migraines    Hypertension since birth     Past Surgical History:   Procedure Laterality Date    HX  SECTION       Family History   Problem Relation Age of Onset    Hypertension Mother     Diabetes Mother     Hypertension Sister     Hypertension Maternal Grandmother     No Known Problems Daughter      Social History   Substance Use Topics    Smoking status: Never Smoker    Smokeless tobacco: Never Used    Alcohol use No          Objective:     Visit Vitals    /79 (BP 1 Location: Left arm, BP Patient Position: Sitting)    Pulse 81    Temp 98.6 °F (37 °C) (Oral)    Resp 16    Ht 5' 4\" (1.626 m)    Wt 242 lb 6.4 oz (110 kg)    LMP 2017 (Exact Date)    SpO2 98%    BMI 41.61 kg/m2     Gen: NAD, pleasant  HEENT: normal appearing head, nares patent, PERRLA, EOMI, oropharynx no erythema, no cervical lymphadenopathy neck supple   Cardio: RRR nl S1S2 no murmur  Lungs CTAB no wheeze no rales no rhonchi  ABD Soft non tender non distended + bowel sounds  Extremities: full ROM X 4 no clubbing no cyanosis  Neuro: no gross focal deficits noted, alert and orientated X 3  Psych.: well groomed no outward signs of depression. Assessment/Plan:   Diagnoses and all orders for this visit:    1. Low HDL (under 40)    2. PCOS (polycystic ovarian syndrome)  -     metFORMIN (GLUCOPHAGE) 500 mg tablet; Take 1 Tab by mouth daily (with breakfast). 3. Encounter to discuss test results    4. IGT (impaired glucose tolerance)  -     metFORMIN (GLUCOPHAGE) 500 mg tablet; Take 1 Tab by mouth daily (with breakfast). 5. Obesity, morbid, BMI 40.0-49.9 ([address]    Other orders  -     prenatal vit-iron fumarate-fa 27 mg iron- 0.8 mg tab tablet; Take 1 Tab by mouth daily. Follow-up Disposition:  Return in about 1 year (around 8/3/2018) for recheck igt and chol 15 min with new doctor at community please . .  avs printed and given to the pt. .would like to start omega three however pt is trying to concieve risk for mi cva due to low hdl and pcos d/w pt will modify her diet! The patient voiced understanding of the above. Medication side effects were reviewed with the patient. Call with any concerns.

## 2017-08-03 NOTE — MR AVS SNAPSHOT
Visit Information Date & Time Provider Department Dept. Phone Encounter #  
 8/3/2017  3:15 PM Paris Kimble, [address] [place] [id number] Follow-up Instructions Return in about 1 year (around 8/3/2018) for recheck igt and chol 15 min with new doctor at community please . Upcoming Health Maintenance Date Due  
 HPV AGE 9Y-34Y (2 of 3 - Female 3 Dose Series) 6/8/2017 INFLUENZA AGE 9 TO ADULT 8/1/2017 PAP AKA CERVICAL CYTOLOGY 4/14/2018 DTaP/Tdap/Td series (2 - Td) 1/11/2026 Allergies as of 8/3/2017  Review Complete On: 8/3/2017 By: Festus Gee Severity Noted Reaction Type Reactions Tomato High 07/29/2011   Systemic Anaphylaxis [place] Flavor  06/27/2011    Hives Ibuprofen  02/22/2016    Other (comments) Pt states med made her chest hurt. Seafood [Shellfish Containing Products]  07/29/2011    Hives Current Immunizations  Reviewed on 4/13/2017 Name Date Influenza Vaccine 11/9/2015 MMR 4/14/2015  9:44 AM  
 Tdap 1/11/2016 10:45 AM  
  
 Not reviewed this visit You Were Diagnosed With   
  
 Codes Comments Low HDL (under 40)    -  Primary ICD-10-CM: E78.6 ICD-9-CM: 272.5 PCOS (polycystic ovarian syndrome)     ICD-10-CM: E28.2 ICD-9-CM: 256.4 Encounter to discuss test results     ICD-10-CM: Z71.89 ICD-9-CM: V65.49 IGT (impaired glucose tolerance)     ICD-10-CM: R73.02 
ICD-9-CM: 790.22 Obesity, morbid, BMI 40.0-49.9 (HCC)     ICD-10-CM: E66.01 
ICD-9-CM: 278.01 Vitals BP Pulse Temp Resp Height(growth percentile) Weight(growth percentile) 134/79 (BP 1 Location: Left arm, BP Patient Position: Sitting) 81 98.6 °F (37 °C) (Oral) 16 5' 4\" (1.626 m) 242 lb 6.4 oz (110 kg) LMP SpO2 BMI OB Status Smoking Status 07/01/2017 (Exact Date) 98% 41.61 kg/m2 Having regular periods Never Smoker Vitals History BMI and BSA Data Body Mass Index Body Surface Area  
 41.61 kg/m 2 2.23 m 2 Preferred Pharmacy Pharmacy Name Phone [place] [id number] [facility] [phone] Your Updated Medication List  
  
   
This list is accurate as of: 8/3/17  3:23 PM.  Always use your most recent med list.  
  
  
  
  
 albuterol 90 mcg/actuation inhaler Commonly known as:  PROVENTIL HFA, VENTOLIN HFA, PROAIR HFA Take 1 Puff by inhalation every six (6) hours as needed for Wheezing. metFORMIN 500 mg tablet Commonly known as:  GLUCOPHAGE Take 1 Tab by mouth daily (with breakfast). prenatal vit-iron fumarate-fa 27 mg iron- 0.8 mg Tab tablet Take 1 Tab by mouth daily. Prescriptions Sent to Pharmacy Refills  
 prenatal vit-iron fumarate-fa 27 mg iron- 0.8 mg tab tablet 6 Sig: Take 1 Tab by mouth daily. Class: Normal  
 Pharmacy: [address] Ph #: [phone] Route: Oral  
 metFORMIN (GLUCOPHAGE) 500 mg tablet 6 Sig: Take 1 Tab by mouth daily (with breakfast). Class: Normal  
 Pharmacy: [address] Ph #: [phone] Route: Oral  
  
Follow-up Instructions Return in about 1 year (around 8/3/2018) for recheck igt and chol 15 min with new doctor at community please . Patient Instructions Learning About the [address] Diet What is the Mediterranean diet? The Mediterranean diet is a style of eating rather than a diet plan. It features foods eaten in [place], [place], [place], and other countries along the [place]. It emphasizes eating foods like fish, fruits, vegetables, beans, high-fiber breads and whole grains, nuts, and olive oil. This style of eating includes limited red meat, cheese, and sweets. Why choose the Mediterranean diet? A Mediterranean-style diet may improve heart health. It contains more fat than other heart-healthy diets. But the fats are mainly from nuts, unsaturated oils (such as fish oils and olive oil), and certain nut or seed oils (such as canola, soybean, or flaxseed oil). These fats may help protect the heart and blood vessels. How can you get started on the Mediterranean diet? Here are some things you can do to switch to a more Mediterranean way of eating. What to eat · Eat a variety of fruits and vegetables each day, such as grapes, blueberries, tomatoes, broccoli, peppers, figs, olives, spinach, eggplant, beans, lentils, and chickpeas. · Eat a variety of whole-grain foods each day, such as oats, brown rice, and whole wheat bread, pasta, and couscous. · Eat fish at least 2 times a week. Try tuna, salmon, mackerel, lake trout, herring, or sardines. · Eat moderate amounts of low-fat dairy products, such as milk, cheese, or yogurt. · Eat moderate amounts of poultry and eggs. · Choose healthy (unsaturated) fats, such as nuts, olive oil, and certain nut or seed oils like canola, soybean, and flaxseed. · Limit unhealthy (saturated) fats, such as butter, palm oil, and coconut oil. And limit fats found in animal products, such as meat and dairy products made with whole milk. Try to eat red meat only a few times a month in very small amounts. · Limit sweets and desserts to only a few times a week. This includes sugar-sweetened drinks like soda. The Mediterranean diet may also include red wine with your meal1 glass each day for women and up to 2 glasses a day for men. Tips for eating at home · Use herbs, spices, garlic, lemon zest, and citrus juice instead of salt to add flavor to foods. · Add avocado slices to your sandwich instead of celestin. · Have fish for lunch or dinner instead of red meat. Brush the fish with olive oil, and broil or grill it. · Sprinkle your salad with seeds or nuts instead of cheese. · Cook with olive or canola oil instead of butter or oils that are high in saturated fat. · Switch from 2% milk or whole milk to 1% or fat-free milk. · Dip raw vegetables in a vinaigrette dressing or hummus instead of dips made from mayonnaise or sour cream. 
· Have a piece of fruit for dessert instead of a piece of cake. Try baked apples, or have some dried fruit. Tips for eating out · Try broiled, grilled, baked, or poached fish instead of having it fried or breaded. · Ask your  to have your meals prepared with olive oil instead of butter. · Order dishes made with marinara sauce or sauces made from olive oil. Avoid sauces made from cream or mayonnaise. · Choose whole-grain breads, whole wheat pasta and pizza crust, brown rice, beans, and lentils. · Cut back on butter or margarine on bread. Instead, you can dip your bread in a small amount of olive oil. · Ask for a side salad or grilled vegetables instead of french fries or chips. Where can you learn more? Go to http://get-erum.info/. Enter [phone] in the search box to learn more about \"Learning About the Mediterranean Diet. \" Current as of: December 29, 2016 Content Version: 11.3 © 8557-2928 [employer]. Care instructions adapted under license by [employer] (which disclaims liability or warranty for this information). If you have questions about a medical condition or this instruction, always ask your healthcare professional. [address] any warranty or liability for your use of this information. Introducing [facility]! Dear Loan Valle: Thank you for requesting a [employer] account. Our records indicate that you already have an active [employer] account. You can access your account anytime at [email or web address] Did you know that you can access your hospital and ER discharge instructions at any time in [employer]? You can also review all of your test results from your hospital stay or ER visit. Additional Information If you have questions, please visit the Frequently Asked Questions section of the [employer] website at [email or web address] Remember, Blend[employer] is NOT to be used for urgent needs. For medical emergencies, dial 911. Now available from your iPhone and Android! Please provide this summary of care documentation to your next provider. Your primary care clinician is listed as Edward Fails. If you have any questions after today's visit, please call [phone].

## 2017-08-03 NOTE — PROGRESS NOTES
Chief Complaint   Patient presents with    Results     1. Have you been to the ER, urgent care clinic since your last visit? Hospitalized since your last visit? No    2. Have you seen or consulted any other health care providers outside of the [facility] since your last visit? Include any pap smears or colon screening.  No

## 2017-08-03 NOTE — PATIENT INSTRUCTIONS
Learning About the [address] [facility] Diet  What is the Mediterranean diet? The Mediterranean diet is a style of eating rather than a diet plan. It features foods eaten in [place], [place], [place], and other countries along the [facility]. It emphasizes eating foods like fish, fruits, vegetables, beans, high-fiber breads and whole grains, nuts, and olive oil. This style of eating includes limited red meat, cheese, and sweets. Why choose the Mediterranean diet? A Mediterranean-style diet may improve heart health. It contains more fat than other heart-healthy diets. But the fats are mainly from nuts, unsaturated oils (such as fish oils and olive oil), and certain nut or seed oils (such as canola, soybean, or flaxseed oil). These fats may help protect the heart and blood vessels. How can you get started on the Mediterranean diet? Here are some things you can do to switch to a more Mediterranean way of eating. What to eat  · Eat a variety of fruits and vegetables each day, such as grapes, blueberries, tomatoes, broccoli, peppers, figs, olives, spinach, eggplant, beans, lentils, and chickpeas. · Eat a variety of whole-grain foods each day, such as oats, brown rice, and whole wheat bread, pasta, and couscous. · Eat fish at least 2 times a week. Try tuna, salmon, mackerel, lake trout, herring, or sardines. · Eat moderate amounts of low-fat dairy products, such as milk, cheese, or yogurt. · Eat moderate amounts of poultry and eggs. · Choose healthy (unsaturated) fats, such as nuts, olive oil, and certain nut or seed oils like canola, soybean, and flaxseed. · Limit unhealthy (saturated) fats, such as butter, palm oil, and coconut oil. And limit fats found in animal products, such as meat and dairy products made with whole milk. Try to eat red meat only a few times a month in very small amounts. · Limit sweets and desserts to only a few times a week.  This includes sugar-sweetened drinks like soda. The Mediterranean diet may also include red wine with your meal--1 glass each day for women and up to 2 glasses a day for men. Tips for eating at home  · Use herbs, spices, garlic, lemon zest, and citrus juice instead of salt to add flavor to foods. · Add avocado slices to your sandwich instead of celestin. · Have fish for lunch or dinner instead of red meat. Brush the fish with olive oil, and broil or grill it. · Sprinkle your salad with seeds or nuts instead of cheese. · Cook with olive or canola oil instead of butter or oils that are high in saturated fat. · Switch from 2% milk or whole milk to 1% or fat-free milk. · Dip raw vegetables in a vinaigrette dressing or hummus instead of dips made from mayonnaise or sour cream.  · Have a piece of fruit for dessert instead of a piece of cake. Try baked apples, or have some dried fruit. Tips for eating out  · Try broiled, grilled, baked, or poached fish instead of having it fried or breaded. · Ask your  to have your meals prepared with olive oil instead of butter. · Order dishes made with marinara sauce or sauces made from olive oil. Avoid sauces made from cream or mayonnaise. · Choose whole-grain breads, whole wheat pasta and pizza crust, brown rice, beans, and lentils. · Cut back on butter or margarine on bread. Instead, you can dip your bread in a small amount of olive oil. · Ask for a side salad or grilled vegetables instead of french fries or chips. Where can you learn more? Go to http://get-erum.info/. Enter [phone] in the search box to learn more about \"Learning About the Mediterranean Diet. \"  Current as of: December 29, 2016  Content Version: 11.3  © 5280-4345 [employer]. Care instructions adapted under license by [employer] (which disclaims liability or warranty for this information).  If you have questions about a medical condition or this instruction, always ask your healthcare professional. [address] any warranty or liability for your use of this information.

## 2018-01-10 LAB
ANTIBODY SCREEN, EXTERNAL: NEGATIVE
CHLAMYDIA, EXTERNAL: NEGATIVE
HBSAG, EXTERNAL: NEGATIVE
HIV, EXTERNAL: NEGATIVE
N. GONORRHEA, EXTERNAL: NEGATIVE
RPR, EXTERNAL: NON REACTIVE
RUBELLA, EXTERNAL: NORMAL
TYPE, ABO & RH, EXTERNAL: NORMAL

## 2018-03-30 ENCOUNTER — HOSPITAL ENCOUNTER (OUTPATIENT)
Dept: PERINATAL CARE | Age: 28
Discharge: HOME | End: 2018-03-30
Attending: OBSTETRICS & GYNECOLOGY
Payer: MEDICAID

## 2018-03-30 PROCEDURE — 76811 OB US DETAILED SNGL FETUS: CPT | Performed by: OBSTETRICS & GYNECOLOGY

## 2018-06-27 LAB — GRBS, EXTERNAL: POSITIVE

## 2018-07-24 ENCOUNTER — HOSPITAL ENCOUNTER (OUTPATIENT)
Dept: OTHER | Age: 28
Discharge: HOME | DRG: 540 | End: 2018-07-24
Payer: MEDICAID

## 2018-07-24 VITALS — BODY MASS INDEX: 44.9 KG/M2 | WEIGHT: 263 LBS | HEIGHT: 64 IN

## 2018-07-24 LAB
ALBUMIN SERPL-MCNC: 2.5 G/DL (ref 3.5–5)
ALBUMIN/GLOB SERPL: 0.6 {RATIO} (ref 1.1–2.2)
ALP SERPL-CCNC: 152 U/L (ref 45–117)
ALT SERPL-CCNC: 22 U/L (ref 12–78)
ANION GAP SERPL CALC-SCNC: 10 MMOL/L (ref 5–15)
AST SERPL-CCNC: 20 U/L (ref 15–37)
BILIRUB SERPL-MCNC: 0.3 MG/DL (ref 0.2–1)
BUN SERPL-MCNC: 6 MG/DL (ref 6–20)
BUN/CREAT SERPL: 10 (ref 12–20)
CALCIUM SERPL-MCNC: 9.1 MG/DL (ref 8.5–10.1)
CHLORIDE SERPL-SCNC: 106 MMOL/L (ref 97–108)
CO2 SERPL-SCNC: 22 MMOL/L (ref 21–32)
CREAT SERPL-MCNC: 0.59 MG/DL (ref 0.55–1.02)
ERYTHROCYTE [DISTWIDTH] IN BLOOD BY AUTOMATED COUNT: 14.5 % (ref 11.5–14.5)
GLOBULIN SER CALC-MCNC: 3.9 G/DL (ref 2–4)
GLUCOSE SERPL-MCNC: 84 MG/DL (ref 65–100)
HCT VFR BLD AUTO: 33 % (ref 35–47)
HGB BLD-MCNC: 10.6 G/DL (ref 11.5–16)
MCH RBC QN AUTO: 27.1 PG (ref 26–34)
MCHC RBC AUTO-ENTMCNC: 32.1 G/DL (ref 30–36.5)
MCV RBC AUTO: 84.4 FL (ref 80–99)
NRBC # BLD: 0 K/UL (ref 0–0.01)
NRBC BLD-RTO: 0 PER 100 WBC
PLATELET # BLD AUTO: 333 K/UL (ref 150–400)
PMV BLD AUTO: 10.1 FL (ref 8.9–12.9)
POTASSIUM SERPL-SCNC: 3.8 MMOL/L (ref 3.5–5.1)
PROT SERPL-MCNC: 6.4 G/DL (ref 6.4–8.2)
RBC # BLD AUTO: 3.91 M/UL (ref 3.8–5.2)
SODIUM SERPL-SCNC: 138 MMOL/L (ref 136–145)
WBC # BLD AUTO: 8.3 K/UL (ref 3.6–11)

## 2018-07-24 PROCEDURE — 80053 COMPREHEN METABOLIC PANEL: CPT | Performed by: OBSTETRICS & GYNECOLOGY

## 2018-07-24 PROCEDURE — 36415 COLL VENOUS BLD VENIPUNCTURE: CPT | Performed by: OBSTETRICS & GYNECOLOGY

## 2018-07-24 PROCEDURE — 85027 COMPLETE CBC AUTOMATED: CPT | Performed by: OBSTETRICS & GYNECOLOGY

## 2018-07-24 RX ORDER — GUAIFENESIN 100 MG/5ML
81 LIQUID (ML) ORAL DAILY
COMMUNITY
End: 2018-07-28

## 2018-07-24 NOTE — PROGRESS NOTES
0900: Patient here for Pre-Admission Testing (PAT) for scheduled induction. PAT packet reviewed with the patient. Labs drawn and sent. Education provided that patient may have clear liquids after midnight and to arrive at 0600 on 7/25/18. Patient verbalizes understanding and sent home with PAT packet for further review. Patient instructed to take no medication(s) on the morning of her scheduled procedure.

## 2018-07-25 ENCOUNTER — HOSPITAL ENCOUNTER (INPATIENT)
Age: 28
LOS: 3 days | Discharge: HOME | DRG: 540 | End: 2018-07-28
Attending: OBSTETRICS & GYNECOLOGY | Admitting: OBSTETRICS & GYNECOLOGY
Payer: MEDICAID

## 2018-07-25 DIAGNOSIS — R52 PAIN: Primary | ICD-10-CM

## 2018-07-25 PROBLEM — O42.90 PROM (PREMATURE RUPTURE OF MEMBRANES): Status: ACTIVE | Noted: 2018-07-25

## 2018-07-25 PROCEDURE — 74011250636 HC RX REV CODE- 250/636: Performed by: OBSTETRICS & GYNECOLOGY

## 2018-07-25 PROCEDURE — 74011000258 HC RX REV CODE- 258: Performed by: OBSTETRICS & GYNECOLOGY

## 2018-07-25 PROCEDURE — 75410000002 HC LABOR FEE PER 1 HR

## 2018-07-25 PROCEDURE — 65270000029 HC RM PRIVATE

## 2018-07-25 PROCEDURE — 4A1H74Z MONITORING OF PRODUCTS OF CONCEPTION, CARDIAC ELECTRICAL ACTIVITY, VIA NATURAL OR ARTIFICIAL OPENING: ICD-10-PCS | Performed by: OBSTETRICS & GYNECOLOGY

## 2018-07-25 RX ORDER — SODIUM CHLORIDE, SODIUM LACTATE, POTASSIUM CHLORIDE, CALCIUM CHLORIDE 600; 310; 30; 20 MG/100ML; MG/100ML; MG/100ML; MG/100ML
125 INJECTION, SOLUTION INTRAVENOUS CONTINUOUS
Status: DISCONTINUED | OUTPATIENT
Start: 2018-07-25 | End: 2018-07-28 | Stop reason: HOSPADM

## 2018-07-25 RX ORDER — SODIUM CHLORIDE 0.9 % (FLUSH) 0.9 %
5-10 SYRINGE (ML) INJECTION AS NEEDED
Status: DISCONTINUED | OUTPATIENT
Start: 2018-07-25 | End: 2018-07-28 | Stop reason: HOSPADM

## 2018-07-25 RX ORDER — ACETAMINOPHEN 325 MG/1
650 TABLET ORAL
Status: DISCONTINUED | OUTPATIENT
Start: 2018-07-25 | End: 2018-07-26 | Stop reason: HOSPADM

## 2018-07-25 RX ORDER — ONDANSETRON 2 MG/ML
4 INJECTION INTRAMUSCULAR; INTRAVENOUS
Status: DISCONTINUED | OUTPATIENT
Start: 2018-07-25 | End: 2018-07-26 | Stop reason: HOSPADM

## 2018-07-25 RX ORDER — NALOXONE HYDROCHLORIDE 0.4 MG/ML
0.4 INJECTION, SOLUTION INTRAMUSCULAR; INTRAVENOUS; SUBCUTANEOUS AS NEEDED
Status: DISCONTINUED | OUTPATIENT
Start: 2018-07-25 | End: 2018-07-26 | Stop reason: HOSPADM

## 2018-07-25 RX ORDER — SODIUM CHLORIDE 0.9 % (FLUSH) 0.9 %
5-10 SYRINGE (ML) INJECTION EVERY 8 HOURS
Status: DISCONTINUED | OUTPATIENT
Start: 2018-07-25 | End: 2018-07-28 | Stop reason: HOSPADM

## 2018-07-25 RX ORDER — OXYTOCIN IN 5 % DEXTROSE 30/500 ML
PLASTIC BAG, INJECTION (ML) INTRAVENOUS
Status: DISPENSED
Start: 2018-07-25 | End: 2018-07-25

## 2018-07-25 RX ORDER — NALBUPHINE HYDROCHLORIDE 10 MG/ML
10 INJECTION, SOLUTION INTRAMUSCULAR; INTRAVENOUS; SUBCUTANEOUS
Status: DISCONTINUED | OUTPATIENT
Start: 2018-07-25 | End: 2018-07-27

## 2018-07-25 RX ORDER — MAG HYDROX/ALUMINUM HYD/SIMETH 200-200-20
30 SUSPENSION, ORAL (FINAL DOSE FORM) ORAL
Status: DISCONTINUED | OUTPATIENT
Start: 2018-07-25 | End: 2018-07-26 | Stop reason: HOSPADM

## 2018-07-25 RX ORDER — OXYTOCIN IN 5 % DEXTROSE 30/500 ML
1-25 PLASTIC BAG, INJECTION (ML) INTRAVENOUS
Status: DISCONTINUED | OUTPATIENT
Start: 2018-07-25 | End: 2018-07-28 | Stop reason: HOSPADM

## 2018-07-25 RX ADMIN — PENICILLIN G POTASSIUM 2.5 MILLION UNITS: 20000000 POWDER, FOR SOLUTION INTRAVENOUS at 21:56

## 2018-07-25 RX ADMIN — SODIUM CHLORIDE, SODIUM LACTATE, POTASSIUM CHLORIDE, AND CALCIUM CHLORIDE 125 ML/HR: 600; 310; 30; 20 INJECTION, SOLUTION INTRAVENOUS at 05:54

## 2018-07-25 RX ADMIN — Medication 2 MILLI-UNITS/MIN: at 20:56

## 2018-07-25 RX ADMIN — NALBUPHINE HYDROCHLORIDE 10 MG: 10 INJECTION, SOLUTION INTRAMUSCULAR; INTRAVENOUS; SUBCUTANEOUS at 15:52

## 2018-07-25 RX ADMIN — Medication 2 MILLI-UNITS/MIN: at 10:15

## 2018-07-25 RX ADMIN — PENICILLIN G POTASSIUM 2.5 MILLION UNITS: 20000000 POWDER, FOR SOLUTION INTRAVENOUS at 14:13

## 2018-07-25 RX ADMIN — SODIUM CHLORIDE 5 MILLION UNITS: 900 INJECTION, SOLUTION INTRAVENOUS at 05:53

## 2018-07-25 RX ADMIN — PENICILLIN G POTASSIUM 2.5 MILLION UNITS: 20000000 POWDER, FOR SOLUTION INTRAVENOUS at 10:09

## 2018-07-25 RX ADMIN — SODIUM CHLORIDE, SODIUM LACTATE, POTASSIUM CHLORIDE, AND CALCIUM CHLORIDE 125 ML/HR: 600; 310; 30; 20 INJECTION, SOLUTION INTRAVENOUS at 10:10

## 2018-07-25 RX ADMIN — PENICILLIN G POTASSIUM 2.5 MILLION UNITS: 20000000 POWDER, FOR SOLUTION INTRAVENOUS at 18:37

## 2018-07-25 NOTE — IP AVS SNAPSHOT
[facility] [address] [place] [address] 
[phone] Patient: Josué Aldana MRN: [id number] KDA:[birth year] About your hospitalization You were admitted on:  July 25, 2018 You last received care in the:  [address] [facility] You were discharged on:  July 28, 2018 Why you were hospitalized Your primary diagnosis was:  Not on File Your diagnoses also included:  Prom (Premature Rupture Of Membranes) Follow-up Information Follow up With Details Comments Contact [address] [place] [address] [facility] [address] [phone] Discharge Orders None A check rosa maria indicates which time of day the medication should be taken. My Medications START taking these medications Instructions Each Dose to Equal  
 Morning Noon Evening Bedtime HYDROmorphone 4 mg tablet Commonly known as:  DILAUDID Your last dose was: Your next dose is: Take 1 Tab by mouth every four (4) hours as needed. Max Daily Amount: 24 mg.  
 4 mg CONTINUE taking these medications Instructions Each Dose to Equal  
 Morning Noon Evening Bedtime  
 albuterol 90 mcg/actuation inhaler Commonly known as:  PROVENTIL HFA, VENTOLIN HFA, PROAIR HFA Your last dose was: Your next dose is: Take 1 Puff by inhalation every six (6) hours as needed for Wheezing. 1 Puff IRON (FERROUS SULFATE) PO Your last dose was: Your next dose is: Take 1 Tab by mouth daily. 1 Tab  
    
   
   
   
  
 metFORMIN 500 mg tablet Commonly known as:  GLUCOPHAGE Your last dose was: Your next dose is: Take 1 Tab by mouth daily (with breakfast). 500 mg  
    
   
   
   
  
 prenatal vit-iron fumarate-fa 27 mg iron- 0.8 mg Tab tablet Your last dose was: Your next dose is: Take 1 Tab by mouth daily. 1 Tab STOP taking these medications   
 aspirin 81 mg chewable tablet Where to Get Your Medications Information on where to get these meds will be given to you by the nurse or doctor. ! Ask your nurse or doctor about these medications HYDROmorphone 4 mg tablet Opioid Education Prescription Opioids: What You Need to Know: 
 
Prescription opioids can be used to help relieve moderate-to-severe pain and are often prescribed following a surgery or injury, or for certain health conditions. These medications can be an important part of treatment but also come with serious risks. Opioids are strong pain medicines. Examples include hydrocodone, oxycodone, fentanyl, and morphine. Heroin is an example of an illegal opioid. It is important to work with your health care provider to make sure you are getting the safest, most effective care. WHAT ARE THE RISKS AND SIDE EFFECTS OF OPIOID USE? Prescription opioids carry serious risks of addiction and overdose, especially with prolonged use. An opioid overdose, often marked by slow breathing, can cause sudden death. The use of prescription opioids can have a number of side effects as well, even when taken as directed. · Tolerance-meaning you might need to take more of a medication for the same pain relief · Physical dependence-meaning you have symptoms of withdrawal when the medication is stopped. Withdrawal symptoms can include nausea, sweating, chills, diarrhea, stomach cramps, and muscle aches. Withdrawal can last up to several weeks, depending on which drug you took and how long you took it. · Increased sensitivity to pain · Constipation · Nausea, vomiting, and dry mouth · Sleepiness and dizziness · Confusion · Depression · Low levels of testosterone that can result in lower sex drive, energy, and strength · Itching and sweating RISKS ARE GREATER WITH:      
· History of drug misuse, substance use disorder, or overdose · Mental health conditions (such as depression or anxiety) · Sleep apnea · Older age (72 years or older) · Pregnancy Avoid alcohol while taking prescription opioids. Also, unless specifically advised by your health care provider, medications to avoid include: · Benzodiazepines (such as Xanax or Valium) · Muscle relaxants (such as Soma or Flexeril) · Hypnotics (such as Ambien or Lunesta) · Other prescription opioids KNOW YOUR OPTIONS Talk to your health care provider about ways to manage your pain that don't involve prescription opioids. Some of these options may actually work better and have fewer risks and side effects. Options may include: 
· Pain relievers such as acetaminophen, ibuprofen, and naproxen · Some medications that are also used for depression or seizures · Physical therapy and exercise · Counseling to help patients learn how to cope better with triggers of pain and stress. · Application of heat or cold compress · Massage therapy · Relaxation techniques Be Informed Make sure you know the name of your medication, how much and how often to take it, and its potential risks & side effects. IF YOU ARE PRESCRIBED OPIOIDS FOR PAIN: 
· Never take opioids in greater amounts or more often than prescribed. Remember the goal is not to be pain-free but to manage your pain at a tolerable level. · Follow up with your primary care provider to: · Work together to create a plan on how to manage your pain. · Talk about ways to help manage your pain that don't involve prescription opioids. · Talk about any and all concerns and side effects. · Help prevent misuse and abuse. · Never sell or share prescription opioids · Help prevent misuse and abuse.  
· Store prescription opioids in a secure place and out of reach of others (this may include visitors, children, friends, and family). · Safely dispose of unused/unwanted prescription opioids: Find your community drug take-back program or your pharmacy mail-back program, or flush them down the toilet, following guidance from the Food and Drug Administration (www.fda.gov/Drugs/ResourcesForYou). · Visit www.cdc.gov/drugoverdose to learn about the risks of opioid abuse and overdose. · If you believe you may be struggling with addiction, tell your health care provider and ask for guidance or call [employer] at 6-162-267-JGSN. Discharge Instructions POSTPARTUM DISCHARGE INSTRUCTIONS Name:  Florentino Huber YOB: 1990 Admission Diagnosis:  PROM (premature rupture of membranes) Discharge Diagnosis:   
Problem List as of 2018  Date Reviewed: 2017 Codes Class Noted - Resolved PROM (premature rupture of membranes) ICD-10-CM: O42.90 ICD-9-CM: 658.10  2018 - Present PCOS (polycystic ovarian syndrome) ICD-10-CM: E28.2 ICD-9-CM: 256.4  2017 - Present Obesity, morbid, BMI 40.0-49.9 ([facility] ICD-10-CM: E66.01 
ICD-9-CM: 278.01  3/31/2017 - Present Pregnancy ICD-10-CM: Z34.90 ICD-9-CM: V22.2  2016 - Present  delivery delivered ICD-10-CM: O82 
ICD-9-CM: 669.71  2016 - Present Encounter for full-term uncomplicated delivery UPZ-88-RU: O80 
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 
ICD-9-CM: 790.29  2015 - Present HTN, age 0-24 ICD-10-CM: I10 
ICD-9-CM: 401.9  2015 - Present HA (headache) ICD-10-CM: H06 ICD-9-CM: 784.0  3/20/2015 - Present Carpal tunnel syndrome on both sides ICD-10-CM: G56.03 
ICD-9-CM: 354.0  3/20/2015 - Present BMI 39.0-39.9,adult ICD-10-CM: R93.04 ICD-9-CM: V85.39  3/20/2015 - Present Family planning ICD-10-CM: Z30.09 
ICD-9-CM: V25.09  3/20/2015 - Present RESOLVED: HTN (hypertension) ICD-10-CM: I10 
ICD-9-CM: 401.9  3/20/2015 - 2015 Attending Physician:  Aylin Ervin MD 
 
Delivery Type:   Section: What to Expect at [facility] Your Recovery: A  section, or , is surgery to deliver your baby through a cut, called an incision that the doctor makes in your lower belly and uterus. You may have some pain in your lower belly and need pain medicine for 1 to 2 weeks. You can expect some vaginal bleeding for several weeks. You will probably need about 6 weeks to fully recover. It is important to take it easy while the incision is healing. Avoid heavy lifting, strenuous activities, or exercises that strain the belly muscles while you are recovering. Ask a family member or friend for help with housework, cooking, and shopping. This care sheet gives you a general idea about how long it will take for you to recover. But each person recovers at a different pace. Follow the steps below to get better as quickly as possible. How can you care for yourself at home? Activity · Rest when you feel tired. Getting enough sleep will help you recover. · Try to walk each day. Start by walking a little more than you did the day before. Bit by bit, increase the amount you walk. Walking boosts blood flow and helps prevent pneumonia, constipation, and blood clots. · Avoid strenuous activities, such as bicycle riding, jogging, weightlifting, and aerobic exercise, for 6 weeks or until your doctor says it is okay. · Until your doctor says it is okay, do not lift anything heavier than your baby. · Do not do sit-ups or other exercise that strain the belly muscles for 6 weeks or until your doctor says it is okay. · Hold a pillow over your incision when you cough or take deep breaths. This will support your belly and decrease your pain. · You may shower as usual. Pat the incision dry when you are done. · You will have some vaginal bleeding. Wear sanitary pads. Do not douche or use tampons until your doctor says it is okay. · Ask your doctor when you can drive again. · You will probably need to take at least 6 weeks off work. It depends on the type of work you do and how you feel. · Wait until you are healed (about 4 to 6 weeks) before you have sexual intercourse. Your doctor will tell you when it is okay to have sex. · Talk to your doctor about birth control. You can get pregnant even before your period returns. Also, you can get pregnant while you are breast-feeding. Incision care Your skin is your body's first line of defense against germs, but an incision site leaves an easy way for germs to enter your body. To prevent infection: · Clean your hands frequently and before and after changing any touching any dressings. · If you have strips of tape on the incision, leave the tape on for a week or until it falls off. · Look at your incision closely every day for any changes. Contact your doctor if you experience any signs of infection, such as fever or increased redness at the surgical site. · Wash the area daily with warm, soapy water, and pat it dry. Don't use hydrogen peroxide or alcohol, which can slow healing. You may cover the area with a gauze bandage if it weeps or rubs against clothing. Change the bandage every day. · Keep the area clean and dry. Diet · You can eat your normal diet. If your stomach is upset, try bland, low-fat foods like plain rice, broiled chicken, toast, and yogurt. · Drink plenty of fluids (unless your doctor tells you not to). · You may notice that your bowel movements are not regular right after your surgery. This is common. Try to avoid constipation and straining with bowel movements. You may want to take a fiber supplement every day. If you have not had a bowel movement after a couple of days, ask your doctor about taking a mild laxative. · If you are breast-feeding, do not drink any alcohol. Medicines · Take pain medicines exactly as directed. · If the doctor gave you a prescription medicine for pain, take it as prescribed. · If you are not taking a prescription pain medicine, ask your doctor if you can take an over-the-counter medicine such as acetaminophen (Tylenol), ibuprofen (Advil, Motrin), or naproxen (Aleve), for cramps. Read and follow all instructions on the label. Do not take aspirin, because it can cause more bleeding. Do not take acetaminophen (Tylenol) and other acetaminophen containing medications (i.e. Percocet) at the same time. · If you think your pain medicine is making you sick to your stomach: 
· Take your medicine after meals (unless your doctor has told you not to). · Ask your doctor for a different pain medicine. · If your doctor prescribed antibiotics, take them as directed. Do not stop taking them just because you feel better. You need to take the full course of antibiotics. Mental Health · Many women get the \"baby blues\" during the first few days after childbirth. You may lose sleep, feel irritable, and cry easily. You may feel happy one minute and sad the next. Hormone changes are one cause of these emotional changes. Also, the demands of a new baby, along with visits from relatives or other family needs, add to a mother's stress. The \"baby blues\" often peak around the fourth day. Then they ease up in less than 2 weeks.  
· If your moodiness or anxiety lasts for more than 2 weeks, or if you feel like life is not worth living, you may have postpartum depression. This is different for each mother. Some mothers with serious depression may worry intensely about their infant's well-being. Others may feel distant from their child. Some mothers might even feel that they might harm their baby. A mother may have signs of paranoia, wondering if someone is watching her. · With all the changes in your life, you may not know if you are depressed. Pregnancy sometimes causes changes in how you feel that are similar to the symptoms of depression. · Symptoms of depression include: · Feeling sad or hopeless and losing interest in daily activities. These are the most common symptoms of depression. · Sleeping too much or not enough. · Feeling tired. You may feel as if you have no energy. · Eating too much or too little. · POSTPARTUM SUPPORT INTERNATIONAL (PSI) offers a Warm line; Chat with the Expert phone sessions; Information and Articles about Pregnancy and Postpartum Mood Disorders; Comprehensive List of Free Support Groups; Knowledgeable local coordinators who will offer support, information, and resources; Guide to Resources on Connectbeam; Calendar of events in the  mood disorders community; Latest News and Research; and [facility] [address] for [employer]. Remember - You are not alone; You are not to blame; With help, you will be well. 2-314-521-PPD(7172). WWW. POSTPARTUM. NET · Writing or talking about death, such as writing suicide notes or talking about guns, knives, or pills. Keep the numbers for these national suicide hotlines: 5-378-914-TALK (9-716.688.6949) and 2-458-UMYOMRI (8-747.854.7997). If you or someone you know talks about suicide or feeling hopeless, get help right away. Other instructions · If you breast-feed your baby, you may be more comfortable while you are healing if you place the baby so that he or she is not resting on your belly. Try tucking your baby under your arm, with his or her body along the side you will be feeding on. Support your baby's upper body with your arm. With that hand you can control your baby's head to bring his or her mouth to your breast. This is sometimes called the football hold. Follow-up care is a key part of your treatment and safety. Be sure to make and go to all appointments, and call your doctor if you are having problems. It's also a good idea to know your test results and keep a list of the medicines you take. When should you call for help? Call 911 anytime you think you may need emergency care. For example, call if: 
· You are thinking of hurting yourself, your baby, or anyone else. · You passed out (lost consciousness). · You have symptoms of a blood clot in your lung (called a pulmonary embolism). These may include: 
· Sudden chest pain. · Trouble breathing. · Coughing up blood. Call your doctor now or seek immediate medical care if: 
 
· You have severe vaginal bleeding. · You are soaking through a pad each hour for 2 or more hours. · Your vaginal bleeding seems to be getting heavier or is still bright red 4 days after delivery. · You are dizzy or lightheaded, or you feel like you may faint. · You are vomiting or cannot keep fluids down. · You have a fever. · You have new or more belly pain. · You have loose stitches, or your incision comes open. · You have symptoms of infection, such as: 
· Increased pain, swelling, warmth, or redness. · Red streaks leading from the incision. · Pus draining from the incision. · A fever · You pass tissue (not just blood). · Your vaginal discharge smells bad. · Your belly feels tender or full and hard. · Your breasts are continuously painful or red. · You feel sad, anxious, or hopeless for more than a few days. · You have sudden, severe pain in your belly.  
· You have symptoms of a blood clot in your leg (called a deep vein thrombosis), such as: 
· Pain in your calf, back of the knee, thigh, or groin. · Redness and swelling in your leg or groin. · You have symptoms of preeclampsia, such as: 
· Sudden swelling of your face, hands, or feet. · New vision problems (such as dimness or blurring). · A severe headache. · Your blood pressure is higher than it should be or rises suddenly. · You have new nausea or vomiting. Watch closely for changes in your health, and be sure to contact your doctor if you have any problems. Additional Information:  Learning About Hypertensive Disorders After Childbirth What is preeclampsia? A woman with preeclampsia has blood pressure that is higher than usual. She may also have other serious symptoms. Preeclampsia can be dangerous. When it is severe, it can cause seizures (eclampsia) or liver or kidney damage. When the liver is affected, some women get HELLP syndrome, a blood-clotting and bleeding problem. HELLP can come on quickly and can be deadly. This is why your doctor checks you and your baby often. Preeclampsia usually occurs after 20 weeks of pregnancy. In rare cases, it is first noted right after childbirth. Most often, it starts near the end of pregnancy and goes away after childbirth. What are the symptoms? Mild preeclampsia usually doesn't cause symptoms. But preeclampsia can cause rapid weight gain and sudden swelling of the hands and face. Severe preeclampsia does cause symptoms. It can cause a very bad headache and trouble seeing and breathing. It also can cause belly pain. You may also urinate less than usual. 
 
If you have new preeclampsia symptoms after you go home from the hospital, call your doctor right away. What can you expect after you have had preeclampsia? In the hospital 
After the baby and the placenta are delivered, preeclampsia usually starts to improve. Most women get better in the first few days after childbirth. After having preeclampsia, you still have a risk of seizures for a day or more after childbirth. (Very rarely, seizures happen later on.) So your doctor may have you take magnesium sulfate for a day or more to prevent seizures. You may also take medicine to lower your blood pressure. When you go home Your blood pressure will most likely return to normal a few days after delivery. Your doctor will want to check your blood pressure sometime in the first week after you leave the hospital. 
 
Some women still have high blood pressure 6 weeks after childbirth. But most return to normal levels over the long term. · Take and record your blood pressure at home if your doctor tells you to. · Learn the importance of the two measures of blood pressure (such as 120 over 80, or 120/80). The first number is the systolic pressure. This is the force of blood on the artery walls as the heart pumps. The second number is the diastolic pressure. This is the force of blood on the artery walls between heartbeats, when the heart is at rest. You have a choice of monitors to use. Manual monitor: You pump up the cuff and use a stethoscope to listen for your  Pulse. · Electronic monitor: The cuff inflates, and a gauge shows your pulse rate. · To take your blood pressure: · Ask your doctor to check your blood pressure monitor to be sure that it is accurate and that the cuff fits you. Also ask your doctor to watch you use it, to make sure that you are using it right. · You should not eat, use tobacco products, or use medicine known to raise blood pressure (such as some nasal decongestant sprays) before you take your blood pressure. · Avoid taking your blood pressure if you have just exercised or are nervous or upset. Rest at least 15 minutes before you take your blood pressure. · Be safe with medicines. If you take medicine, take it exactly as prescribed.  Call your doctor if you think you are having a problem with your medicine. · Do not smoke. Quitting smoking will help lower your blood pressure and improve your baby's growth and health. If you need help quitting, talk to your doctor about stop-smoking programs and medicines. These can increase your chances of quitting for good. · Eat a balanced and healthy diet that has lots of fruits and vegetables. Long-term health After you have had preeclampsia, you have a higher-than-average risk of heart disease, stroke, and kidney disease. This may be because the same things that cause preeclampsia also cause heart and kidney disease. To protect your health, work with your doctor on living a heart-healthy lifestyle and getting the checkups you need. Your doctor may also want you to check your blood pressure at home. Follow-up care is a key part of your treatment and safety. Be sure to make and go to all appointments, and call your doctor if you are having problems. It's also a good idea to know your test results and keep a list of the medicines you take. These are general instructions for a healthy lifestyle: No smoking/ No tobacco products/ Avoid exposure to second hand smoke Surgeon General's Warning:  Quitting smoking now greatly reduces serious risk to your health. Obesity, smoking, and sedentary lifestyle greatly increases your risk for illness A healthy diet, regular physical exercise & weight monitoring are important for maintaining a healthy lifestyle Recognize signs and symptoms of STROKE: 
 
F-face looks uneven A-arms unable to move or move unevenly S-speech slurred or non-existent T-time-call 911 as soon as signs and symptoms begin - DO NOT go  
    back to bed or wait to see if you get better - TIME IS BRAIN. I have had the opportunity to make my options or choices for discharge. I have received and understand these instructions. [employer] Announcement We are excited to announce that we are making your provider's discharge notes available to you in FiNC. You will see these notes when they are completed and signed by the physician that discharged you from your recent hospital stay. If you have any questions or concerns about any information you see in FiNC, please call the Health Information Department where you were seen or reach out to your Primary Care Provider for more information about your plan of care. Introducing [facility] & HEALTH SERVICES! Dear Leroy Hill: Thank you for requesting a FiNC account. Our records indicate that you already have an active FiNC account. You can access your account anytime at [email or web address] Did you know that you can access your hospital and ER discharge instructions at any time in FiNC? You can also review all of your test results from your hospital stay or ER visit. Additional Information If you have questions, please visit the Frequently Asked Questions section of the FiNC website at [email or web address] Remember, FiNC is NOT to be used for urgent needs. For medical emergencies, dial 911. Now available from your iPhone and Android! Introducing Willy Garcia As a Mayra Cerda patient, I wanted to make you aware of our electronic visit tool called [employer]. [facility]da 24/7 allows you to connect within minutes with a medical provider 24 hours a day, seven days a week via a mobile device or tablet or logging into a secure website from your computer. You can access Willy Garcia from anywhere in the [place].  
 
A virtual visit might be right for you when you have a simple condition and feel like you just dont want to get out of bed, or cant get away from work for an appointment, when your regular Mayra Xavierer provider is not available (evenings, weekends or holidays), or when youre out of town and need minor care. Electronic visits cost only $49 and if the [employer] provider determines a prescription is needed to treat your condition, one can be electronically transmitted to a nearby pharmacy*. Please take a moment to enroll today if you have not already done so. The enrollment process is free and takes just a few minutes. To enroll, please download the [employer] rochelle to your tablet or phone, or visit [email or web address] to enroll on your computer. And, as an [address] [place] [address] patient with a [employer] account, the results of your visits will be scanned into your electronic medical record and your primary care provider will be able to view the scanned results. We urge you to continue to see your regular Gala Bettencourt provider for your ongoing medical care. And while your primary care provider may not be the one available when you seek a [employer] virtual visit, the peace of mind you get from getting a real diagnosis real time can be priceless. For more information on [employer], view our Frequently Asked Questions (FAQs) at [email or web address]. Sincerely, 
 
Socorro Stockton MD 
Chief Medical Officer [address] *:  certain medications cannot be prescribed via [employer] Providers Seen During Your Hospitalization Provider Specialty Primary office phone Quiana Roberts MD Gynecology [phone] Your Primary Care Physician (PCP) Primary Care Physician Office Phone Office Fax Omar Trammell [phone] You are allergic to the following Allergen Reactions Tomato Anaphylaxis Citrus Flavor Hives Ibuprofen Other (comments) Pt states med made her chest hurt. Seafood (Shellfish Containing Products) Hives Recent Documentation Height Weight Breastfeeding? BMI OB Status Smoking Status 1.626 m 119.3 kg Unknown 45.14 kg/m2 Recent pregnancy Never Smoker Emergency Contacts Name Discharge Info Relation Home Work Mobile [address] [1] Spouse [3] [phone] Patient Belongings The following personal items are in your possession at time of discharge: 
  Dental Appliances: None  Visual Aid: Glasses      Home Medications: None   Jewelry: None  Clothing: None    Other Valuables: Cell Phone  Personal Items Sent to Safe: none Please provide this summary of care documentation to your next provider. Signatures-by signing, you are acknowledging that this After Visit Summary has been reviewed with you and you have received a copy. Patient Signature:  ____________________________________________________________ Date:  ____________________________________________________________  
  
[facility] Provider Signature:  ____________________________________________________________ Date:  ____________________________________________________________

## 2018-07-25 NOTE — PROGRESS NOTES
/-3, ctx not adequate yet. Pit at 25 - can go to 25 if indicated. Ctx most recently closer together but coupling. Pt still motivated to pursue . fht reassuring.

## 2018-07-25 NOTE — PROGRESS NOTES
Labor Progress Note  Patient seen, fetal heart rate and contraction pattern evaluated, patient examined. Patient Vitals for the past 1 hrs:   Temp   07/25/18 1230 98.3 °F (36.8 °C)       Physical Exam:  Cervical Exam:  Unchanged; iupc placed  Membranes:  Prior ROM  Uterine Activity: ctx every 4-5, pit at 8  Fetal Heart Rate: 120s mod +accels no decels    Assessment/Plan:  tolac iol; titrate pitocin to adequacy.

## 2018-07-25 NOTE — H&P
History & Physical    Name: Parvin Richardson MRN: [id number]  SSN: [id number]    YOB: 1990  Age: 32 y.o. Sex: female      Subjective:     Reason for Admission:  Pregnancy and Chronic Hypertension and PROM    History of Present Illness: Parvin Richardson is a 32 y.o.  female with an estimated gestational age of 37w0d with Estimated Date of Delivery: 18. Patient complains of mild contractions and moderate vaginal leaking of fluid for 1 days. Pregnancy has been complicated by chronic hypertension and Prior  Section. Patient denies abdominal pain  , chest pain, fever, headache , nausea and vomiting, pelvic pressure, right upper quadrant pain  , shortness of breath, swelling, vaginal bleeding  and visual disturbances. OB History      Para Term  AB Living    2 1 1   1    SAB TAB Ectopic Molar Multiple Live Births        0 1        Past Medical History:   Diagnosis Date    Anemia since birth   [facility] Gestational hypertension     Headache     migraines    Hypertension since birth     Past Surgical History:   Procedure Laterality Date     DELIVERY ONLY  2016    HX  SECTION       Social History     Occupational History          works at [employer]     Social History Main Topics    Smoking status: Never Smoker    Smokeless tobacco: Never Used    Alcohol use No    Drug use: No      Comment: denies     Sexual activity: Not Currently     Partners: Male     Birth control/ protection: None      Comment: vijaya Holm     Family History   Problem Relation Age of Onset    Hypertension Mother     Diabetes Mother     Hypertension Sister     Hypertension Maternal Grandmother     No Known Problems Daughter        Allergies   Allergen Reactions    Tomato Anaphylaxis    Fleming Flavor Hives    Ibuprofen Other (comments)     Pt states med made her chest hurt.     Seafood [Shellfish Containing Products] Hives     Prior to Admission medications    Medication Sig Start Date End Date Taking? Authorizing Provider   IRON, FERROUS SULFATE, PO Take 1 Tab by mouth daily. Yes Historical Provider   aspirin 81 mg chewable tablet Take 81 mg by mouth daily. Yes Historical Provider   prenatal vit-iron fumarate-fa 27 mg iron- 0.8 mg tab tablet Take 1 Tab by mouth daily. 8/3/17  Yes Deidra Godwin MD   metFORMIN (GLUCOPHAGE) 500 mg tablet Take 1 Tab by mouth daily (with breakfast). 8/3/17   Clem Santamaria MD   albuterol (PROVENTIL HFA, VENTOLIN HFA, PROAIR HFA) 90 mcg/actuation inhaler Take 1 Puff by inhalation every six (6) hours as needed for Wheezing. 3/31/17   Clem Santamaria MD        Review of Systems    Objective:     Vitals:    Vitals:    18 0451 18 0500   BP:  130/80   Pulse:  92   Resp:  16   Temp:  98.2 °F (36.8 °C)   Weight: 119.3 kg (263 lb)    Height: 5' 4\" (1.626 m)       Temp (24hrs), Av.2 °F (36.8 °C), Min:98.2 °F (36.8 °C), Max:98.2 °F (36.8 °C)    BP  Min: 130/80  Max: 130/80     Physical Exam    Cervical Exam: 1cm per RN  Uterine Activity: irregular contractions  Membranes: Premature Rupture of Membranes;  Amniotic Fluid: clear fluid  Fetal Heart Rate: Baseline: 130 per minute  Variability: moderate  Accelerations: yes  Decelerations: none  Uterine contractions: irregular, every 5-10 minutes       Labs:   Recent Results (from the past 24 hour(s))   CBC W/O DIFF    Collection Time: 18  9:45 AM   Result Value Ref Range    WBC 8.3 3.6 - 11.0 K/uL    RBC 3.91 3.80 - 5.20 M/uL    HGB 10.6 (L) 11.5 - 16.0 g/dL    HCT 33.0 (L) 35.0 - 47.0 %    MCV 84.4 80.0 - 99.0 FL    MCH 27.1 26.0 - 34.0 PG    MCHC 32.1 30.0 - 36.5 g/dL    RDW 14.5 11.5 - 14.5 %    PLATELET 155 050 - 763 K/uL    MPV 10.1 8.9 - 12.9 FL    NRBC 0.0 0  WBC    ABSOLUTE NRBC 0.00 0.00 - 6.33 K/uL   METABOLIC PANEL, COMPREHENSIVE    Collection Time: 18  9:45 AM   Result Value Ref Range    Sodium 138 136 - 145 mmol/L Potassium 3.8 3.5 - 5.1 mmol/L    Chloride 106 97 - 108 mmol/L    CO2 22 21 - 32 mmol/L    Anion gap 10 5 - 15 mmol/L    Glucose 84 65 - 100 mg/dL    BUN 6 6 - 20 MG/DL    Creatinine 0.59 0.55 - 1.02 MG/DL    BUN/Creatinine ratio 10 (L) 12 - 20      GFR est AA >60 >60 ml/min/1.73m2    GFR est non-AA >60 >60 ml/min/1.73m2    Calcium 9.1 8.5 - 10.1 MG/DL    Bilirubin, total 0.3 0.2 - 1.0 MG/DL    ALT (SGPT) 22 12 - 78 U/L    AST (SGOT) 20 15 - 37 U/L    Alk.  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

## 2018-07-25 NOTE — PROGRESS NOTES
0451- pt arrived to l&d reporting suspected ROM at 0400 and cx's starting around 0300. Denies vaginal bleeding. Induction scheduled for 0600 today for HTN.     0527- spoke with dr Uyne Rasmussen on the phone. Plan to give first dose of penicillin for GBS+ status and allow pt to labor. 46- dr Uyen Rasmussen in room to assess    8872- Bedside, Verbal and Written shift change report given to L. Luellen Canavan (oncoming nurse) by Francisco Aguilar RN (offgoing nurse). Report included the following information SBAR, Kardex, MAR, Accordion and Recent Results.

## 2018-07-25 NOTE — PROGRESS NOTES
Labor Progress Note  Patient seen, fetal heart rate and contraction pattern evaluated, patient examined. C/o ctx increasing in intensity. Patient Vitals for the past 1 hrs:   BP Temp Pulse Resp   07/25/18 0745 123/74 98.2 °F (36.8 °C) 78 16       Physical Exam:  Cervical Exam:  3/50/-3 vertex  Membranes:  Prior ROM  Uterine Activity: CTX every 5  Fetal Heart Rate: 120s mod +accels no decels  EFW 7.5#    Recent Results (from the past 24 hour(s))   CBC W/O DIFF    Collection Time: 07/24/18  9:45 AM   Result Value Ref Range    WBC 8.3 3.6 - 11.0 K/uL    RBC 3.91 3.80 - 5.20 M/uL    HGB 10.6 (L) 11.5 - 16.0 g/dL    HCT 33.0 (L) 35.0 - 47.0 %    MCV 84.4 80.0 - 99.0 FL    MCH 27.1 26.0 - 34.0 PG    MCHC 32.1 30.0 - 36.5 g/dL    RDW 14.5 11.5 - 14.5 %    PLATELET 173 860 - 156 K/uL    MPV 10.1 8.9 - 12.9 FL    NRBC 0.0 0  WBC    ABSOLUTE NRBC 0.00 0.00 - 0.79 K/uL   METABOLIC PANEL, COMPREHENSIVE    Collection Time: 07/24/18  9:45 AM   Result Value Ref Range    Sodium 138 136 - 145 mmol/L    Potassium 3.8 3.5 - 5.1 mmol/L    Chloride 106 97 - 108 mmol/L    CO2 22 21 - 32 mmol/L    Anion gap 10 5 - 15 mmol/L    Glucose 84 65 - 100 mg/dL    BUN 6 6 - 20 MG/DL    Creatinine 0.59 0.55 - 1.02 MG/DL    BUN/Creatinine ratio 10 (L) 12 - 20      GFR est AA >60 >60 ml/min/1.73m2    GFR est non-AA >60 >60 ml/min/1.73m2    Calcium 9.1 8.5 - 10.1 MG/DL    Bilirubin, total 0.3 0.2 - 1.0 MG/DL    ALT (SGPT) 22 12 - 78 U/L    AST (SGOT) 20 15 - 37 U/L    Alk. phosphatase 152 (H) 45 - 117 U/L    Protein, total 6.4 6.4 - 8.2 g/dL    Albumin 2.5 (L) 3.5 - 5.0 g/dL    Globulin 3.9 2.0 - 4.0 g/dL    A-G Ratio 0.6 (L) 1.1 - 2.2             Assessment/Plan:  TOLAC s/p SROM. Progressing - latent labor. Discussed pitocin prn. Discussed again with pt risks including uterine rupture, increased risk for complications including infection and bleeding with failed TOLAC. Prior CS for NRFHT of 8# baby. Pt to be NPO. Continuous monitoring.  GBS positive on antibiotics. FHT reassuring.   CHTN-no meds; BPs normal.

## 2018-07-25 NOTE — PROGRESS NOTES
Bedside shift change report given to Sriram Nazario  (oncoming nurse) by Nehemiah Goldmann RN (offgoing nurse). Report included the following information SBAR, Kardex and MAR. 1020 pt's contractions have spaced out and lessened in intensity, pitocin started at this time. 1824 pitocin stopped at this time, Dr Francia ward with pt taking a couple hour break and then restarting. 1939 Bedside shift change report given to Nehemiah Goldmann RN (oncoming nurse) by DANA Gorman RN (offgoing nurse). Report included the following information SBAR, Kardex and MAR.

## 2018-07-26 ENCOUNTER — ANESTHESIA EVENT (OUTPATIENT)
Dept: LABOR AND DELIVERY | Age: 28
DRG: 540 | End: 2018-07-26
Payer: MEDICAID

## 2018-07-26 ENCOUNTER — ANESTHESIA (OUTPATIENT)
Dept: LABOR AND DELIVERY | Age: 28
DRG: 540 | End: 2018-07-26
Payer: MEDICAID

## 2018-07-26 PROCEDURE — 74011000250 HC RX REV CODE- 250

## 2018-07-26 PROCEDURE — 77030019952 HC CANSTR VAC ASST KCON -B

## 2018-07-26 PROCEDURE — 77030012890

## 2018-07-26 PROCEDURE — 74011250636 HC RX REV CODE- 250/636

## 2018-07-26 PROCEDURE — 77030011943

## 2018-07-26 PROCEDURE — 65410000002 HC RM PRIVATE OB

## 2018-07-26 PROCEDURE — 77030014125 HC TY EPDRL BBMI -B: Performed by: ANESTHESIOLOGY

## 2018-07-26 PROCEDURE — 76010000391 HC C SECN FIRST 1 HR: Performed by: OBSTETRICS & GYNECOLOGY

## 2018-07-26 PROCEDURE — 76010000392 HC C SECN EA ADDL 0.5 HR: Performed by: OBSTETRICS & GYNECOLOGY

## 2018-07-26 PROCEDURE — 75410000003 HC RECOV DEL/VAG/CSECN EA 0.5 HR: Performed by: OBSTETRICS & GYNECOLOGY

## 2018-07-26 PROCEDURE — 77030011640 HC PAD GRND REM COVD -A

## 2018-07-26 PROCEDURE — 77030018717 HC DRSG GRNUFM KCON -B

## 2018-07-26 PROCEDURE — 77030031139 HC SUT VCRL2 J&J -A

## 2018-07-26 PROCEDURE — 77030018846 HC SOL IRR STRL H20 ICUM -A

## 2018-07-26 PROCEDURE — 77030018836 HC SOL IRR NACL ICUM -A

## 2018-07-26 PROCEDURE — 77030034849

## 2018-07-26 PROCEDURE — 77030008467 HC STPLR SKN COVD -B

## 2018-07-26 PROCEDURE — A4300 CATH IMPL VASC ACCESS PORTAL: HCPCS

## 2018-07-26 PROCEDURE — 77030032490 HC SLV COMPR SCD KNE COVD -B

## 2018-07-26 PROCEDURE — 74011250636 HC RX REV CODE- 250/636: Performed by: OBSTETRICS & GYNECOLOGY

## 2018-07-26 PROCEDURE — 74011250636 HC RX REV CODE- 250/636: Performed by: ANESTHESIOLOGY

## 2018-07-26 PROCEDURE — 76060000078 HC EPIDURAL ANESTHESIA: Performed by: OBSTETRICS & GYNECOLOGY

## 2018-07-26 PROCEDURE — 75410000002 HC LABOR FEE PER 1 HR

## 2018-07-26 PROCEDURE — 77030002933 HC SUT MCRYL J&J -A

## 2018-07-26 RX ORDER — MORPHINE SULFATE 0.5 MG/ML
INJECTION, SOLUTION EPIDURAL; INTRATHECAL; INTRAVENOUS AS NEEDED
Status: DISCONTINUED | OUTPATIENT
Start: 2018-07-26 | End: 2018-07-26 | Stop reason: HOSPADM

## 2018-07-26 RX ORDER — EPHEDRINE SULFATE 50 MG/ML
INJECTION, SOLUTION INTRAVENOUS
Status: DISPENSED
Start: 2018-07-26 | End: 2018-07-26

## 2018-07-26 RX ORDER — OXYTOCIN/RINGER'S LACTATE 20/1000 ML
PLASTIC BAG, INJECTION (ML) INTRAVENOUS
Status: DISPENSED
Start: 2018-07-26 | End: 2018-07-26

## 2018-07-26 RX ORDER — ACETAMINOPHEN 325 MG/1
650 TABLET ORAL
Status: DISCONTINUED | OUTPATIENT
Start: 2018-07-26 | End: 2018-07-28 | Stop reason: HOSPADM

## 2018-07-26 RX ORDER — HYDROCORTISONE 1 %
CREAM (GRAM) TOPICAL AS NEEDED
Status: DISCONTINUED | OUTPATIENT
Start: 2018-07-26 | End: 2018-07-28 | Stop reason: HOSPADM

## 2018-07-26 RX ORDER — BUPIVACAINE HYDROCHLORIDE 2.5 MG/ML
INJECTION, SOLUTION EPIDURAL; INFILTRATION; INTRACAUDAL
Status: COMPLETED
Start: 2018-07-26 | End: 2018-07-26

## 2018-07-26 RX ORDER — LIDOCAINE HYDROCHLORIDE 10 MG/ML
INJECTION, SOLUTION EPIDURAL; INFILTRATION; INTRACAUDAL; PERINEURAL
Status: DISPENSED
Start: 2018-07-26 | End: 2018-07-26

## 2018-07-26 RX ORDER — FENTANYL CITRATE 50 UG/ML
INJECTION, SOLUTION INTRAMUSCULAR; INTRAVENOUS AS NEEDED
Status: DISCONTINUED | OUTPATIENT
Start: 2018-07-26 | End: 2018-07-26 | Stop reason: HOSPADM

## 2018-07-26 RX ORDER — AMMONIA 15 % (W/V)
1 AMPUL (EA) INHALATION AS NEEDED
Status: DISCONTINUED | OUTPATIENT
Start: 2018-07-26 | End: 2018-07-28 | Stop reason: HOSPADM

## 2018-07-26 RX ORDER — FENTANYL CITRATE 50 UG/ML
INJECTION, SOLUTION INTRAMUSCULAR; INTRAVENOUS
Status: COMPLETED
Start: 2018-07-26 | End: 2018-07-26

## 2018-07-26 RX ORDER — CEFAZOLIN SODIUM/WATER 2 G/20 ML
2 SYRINGE (ML) INTRAVENOUS ONCE
Status: ACTIVE | OUTPATIENT
Start: 2018-07-26 | End: 2018-07-26

## 2018-07-26 RX ORDER — DOCUSATE SODIUM 100 MG/1
100 CAPSULE, LIQUID FILLED ORAL
Status: DISCONTINUED | OUTPATIENT
Start: 2018-07-26 | End: 2018-07-28 | Stop reason: HOSPADM

## 2018-07-26 RX ORDER — LIDOCAINE HYDROCHLORIDE AND EPINEPHRINE 15; 5 MG/ML; UG/ML
INJECTION, SOLUTION EPIDURAL AS NEEDED
Status: DISCONTINUED | OUTPATIENT
Start: 2018-07-26 | End: 2018-07-26 | Stop reason: HOSPADM

## 2018-07-26 RX ORDER — ONDANSETRON 2 MG/ML
4 INJECTION INTRAMUSCULAR; INTRAVENOUS
Status: DISCONTINUED | OUTPATIENT
Start: 2018-07-26 | End: 2018-07-28 | Stop reason: HOSPADM

## 2018-07-26 RX ORDER — LIDOCAINE HYDROCHLORIDE AND EPINEPHRINE 20; 5 MG/ML; UG/ML
INJECTION, SOLUTION EPIDURAL; INFILTRATION; INTRACAUDAL; PERINEURAL
Status: DISPENSED
Start: 2018-07-26 | End: 2018-07-26

## 2018-07-26 RX ORDER — SODIUM CHLORIDE 0.9 % (FLUSH) 0.9 %
5-10 SYRINGE (ML) INJECTION EVERY 8 HOURS
Status: DISCONTINUED | OUTPATIENT
Start: 2018-07-26 | End: 2018-07-28 | Stop reason: HOSPADM

## 2018-07-26 RX ORDER — FENTANYL CITRATE 50 UG/ML
100 INJECTION, SOLUTION INTRAMUSCULAR; INTRAVENOUS ONCE
Status: DISCONTINUED | OUTPATIENT
Start: 2018-07-26 | End: 2018-07-26 | Stop reason: HOSPADM

## 2018-07-26 RX ORDER — EPHEDRINE SULFATE 50 MG/ML
10 INJECTION, SOLUTION INTRAVENOUS
Status: DISCONTINUED | OUTPATIENT
Start: 2018-07-26 | End: 2018-07-26 | Stop reason: HOSPADM

## 2018-07-26 RX ORDER — BUPIVACAINE HYDROCHLORIDE 5 MG/ML
30 INJECTION, SOLUTION EPIDURAL; INTRACAUDAL ONCE
Status: DISCONTINUED | OUTPATIENT
Start: 2018-07-26 | End: 2018-07-26 | Stop reason: HOSPADM

## 2018-07-26 RX ORDER — FENTANYL/BUPIVACAINE/NS/PF 2-1250MCG
1-16 PREFILLED PUMP RESERVOIR EPIDURAL CONTINUOUS
Status: DISCONTINUED | OUTPATIENT
Start: 2018-07-26 | End: 2018-07-28 | Stop reason: HOSPADM

## 2018-07-26 RX ORDER — BUPIVACAINE HYDROCHLORIDE 7.5 MG/ML
INJECTION, SOLUTION EPIDURAL; RETROBULBAR AS NEEDED
Status: DISCONTINUED | OUTPATIENT
Start: 2018-07-26 | End: 2018-07-26 | Stop reason: HOSPADM

## 2018-07-26 RX ORDER — DIPHENHYDRAMINE HCL 25 MG
25 CAPSULE ORAL
Status: DISCONTINUED | OUTPATIENT
Start: 2018-07-26 | End: 2018-07-28 | Stop reason: HOSPADM

## 2018-07-26 RX ORDER — PHENYLEPHRINE 10 MG/250 ML(40 MCG/ML)IN 0.9 % SOD.CHLORIDE INTRAVENOUS
Status: DISPENSED
Start: 2018-07-26 | End: 2018-07-26

## 2018-07-26 RX ORDER — OXYCODONE AND ACETAMINOPHEN 5; 325 MG/1; MG/1
2 TABLET ORAL
Status: DISCONTINUED | OUTPATIENT
Start: 2018-07-26 | End: 2018-07-27

## 2018-07-26 RX ORDER — NALOXONE HYDROCHLORIDE 0.4 MG/ML
0.4 INJECTION, SOLUTION INTRAMUSCULAR; INTRAVENOUS; SUBCUTANEOUS AS NEEDED
Status: DISCONTINUED | OUTPATIENT
Start: 2018-07-26 | End: 2018-07-26 | Stop reason: HOSPADM

## 2018-07-26 RX ORDER — OXYCODONE AND ACETAMINOPHEN 5; 325 MG/1; MG/1
1 TABLET ORAL
Status: DISCONTINUED | OUTPATIENT
Start: 2018-07-26 | End: 2018-07-27

## 2018-07-26 RX ORDER — BUPIVACAINE HYDROCHLORIDE 2.5 MG/ML
INJECTION, SOLUTION EPIDURAL; INFILTRATION; INTRACAUDAL AS NEEDED
Status: DISCONTINUED | OUTPATIENT
Start: 2018-07-26 | End: 2018-07-26 | Stop reason: HOSPADM

## 2018-07-26 RX ORDER — SODIUM CHLORIDE 0.9 % (FLUSH) 0.9 %
5-10 SYRINGE (ML) INJECTION AS NEEDED
Status: DISCONTINUED | OUTPATIENT
Start: 2018-07-26 | End: 2018-07-28 | Stop reason: HOSPADM

## 2018-07-26 RX ORDER — OXYTOCIN/RINGER'S LACTATE 20/1000 ML
125-1000 PLASTIC BAG, INJECTION (ML) INTRAVENOUS AS NEEDED
Status: DISCONTINUED | OUTPATIENT
Start: 2018-07-26 | End: 2018-07-27

## 2018-07-26 RX ORDER — HYDROCORTISONE ACETATE PRAMOXINE HCL 2.5; 1 G/100G; G/100G
CREAM TOPICAL AS NEEDED
Status: DISCONTINUED | OUTPATIENT
Start: 2018-07-26 | End: 2018-07-28 | Stop reason: HOSPADM

## 2018-07-26 RX ORDER — SODIUM CHLORIDE, SODIUM LACTATE, POTASSIUM CHLORIDE, CALCIUM CHLORIDE 600; 310; 30; 20 MG/100ML; MG/100ML; MG/100ML; MG/100ML
125 INJECTION, SOLUTION INTRAVENOUS CONTINUOUS
Status: DISCONTINUED | OUTPATIENT
Start: 2018-07-26 | End: 2018-07-28 | Stop reason: HOSPADM

## 2018-07-26 RX ORDER — SIMETHICONE 80 MG
80 TABLET,CHEWABLE ORAL
Status: DISCONTINUED | OUTPATIENT
Start: 2018-07-26 | End: 2018-07-28 | Stop reason: HOSPADM

## 2018-07-26 RX ORDER — CEFAZOLIN SODIUM/WATER 2 G/20 ML
SYRINGE (ML) INTRAVENOUS
Status: DISPENSED
Start: 2018-07-26 | End: 2018-07-26

## 2018-07-26 RX ORDER — FENTANYL/BUPIVACAINE/NS/PF 2-1250MCG
PREFILLED PUMP RESERVOIR EPIDURAL
Status: COMPLETED
Start: 2018-07-26 | End: 2018-07-26

## 2018-07-26 RX ORDER — NALBUPHINE HYDROCHLORIDE 10 MG/ML
2.5 INJECTION, SOLUTION INTRAMUSCULAR; INTRAVENOUS; SUBCUTANEOUS
Status: DISCONTINUED | OUTPATIENT
Start: 2018-07-26 | End: 2018-07-26 | Stop reason: HOSPADM

## 2018-07-26 RX ADMIN — NALBUPHINE HYDROCHLORIDE 10 MG: 10 INJECTION, SOLUTION INTRAMUSCULAR; INTRAVENOUS; SUBCUTANEOUS at 23:36

## 2018-07-26 RX ADMIN — BUPIVACAINE HYDROCHLORIDE 1 ML: 7.5 INJECTION, SOLUTION EPIDURAL; RETROBULBAR at 11:21

## 2018-07-26 RX ADMIN — PENICILLIN G POTASSIUM 2.5 MILLION UNITS: 20000000 POWDER, FOR SOLUTION INTRAVENOUS at 06:09

## 2018-07-26 RX ADMIN — LIDOCAINE HYDROCHLORIDE AND EPINEPHRINE 14 ML: 15; 5 INJECTION, SOLUTION EPIDURAL at 11:00

## 2018-07-26 RX ADMIN — BUPIVACAINE HYDROCHLORIDE 3 ML: 2.5 INJECTION, SOLUTION EPIDURAL; INFILTRATION; INTRACAUDAL at 02:52

## 2018-07-26 RX ADMIN — BUPIVACAINE HYDROCHLORIDE 2 ML: 2.5 INJECTION, SOLUTION EPIDURAL; INFILTRATION; INTRACAUDAL at 02:48

## 2018-07-26 RX ADMIN — NALBUPHINE HYDROCHLORIDE 10 MG: 10 INJECTION, SOLUTION INTRAMUSCULAR; INTRAVENOUS; SUBCUTANEOUS at 20:20

## 2018-07-26 RX ADMIN — Medication 10 ML/HR: at 03:17

## 2018-07-26 RX ADMIN — SODIUM CHLORIDE, SODIUM LACTATE, POTASSIUM CHLORIDE, AND CALCIUM CHLORIDE 125 ML/HR: 600; 310; 30; 20 INJECTION, SOLUTION INTRAVENOUS at 20:14

## 2018-07-26 RX ADMIN — PENICILLIN G POTASSIUM 2.5 MILLION UNITS: 20000000 POWDER, FOR SOLUTION INTRAVENOUS at 02:45

## 2018-07-26 RX ADMIN — SODIUM CHLORIDE, SODIUM LACTATE, POTASSIUM CHLORIDE, AND CALCIUM CHLORIDE 500 ML: 600; 310; 30; 20 INJECTION, SOLUTION INTRAVENOUS at 01:54

## 2018-07-26 RX ADMIN — Medication 10 ML/HR: at 08:53

## 2018-07-26 RX ADMIN — LIDOCAINE HYDROCHLORIDE AND EPINEPHRINE 5 ML: 15; 5 INJECTION, SOLUTION EPIDURAL at 02:44

## 2018-07-26 RX ADMIN — FENTANYL CITRATE 100 MCG: 50 INJECTION, SOLUTION INTRAMUSCULAR; INTRAVENOUS at 02:46

## 2018-07-26 RX ADMIN — BUPIVACAINE HYDROCHLORIDE 3 ML: 2.5 INJECTION, SOLUTION EPIDURAL; INFILTRATION; INTRACAUDAL at 02:50

## 2018-07-26 RX ADMIN — BUPIVACAINE HYDROCHLORIDE 1 ML: 7.5 INJECTION, SOLUTION EPIDURAL; RETROBULBAR at 12:16

## 2018-07-26 RX ADMIN — MORPHINE SULFATE 250 MCG: 0.5 INJECTION, SOLUTION EPIDURAL; INTRATHECAL; INTRAVENOUS at 11:16

## 2018-07-26 NOTE — PROCEDURES
Section Delivery Operative Report     Date of Surgery: 2018     Preoperative Diagnosis: fetal intolerance of labor, prior  section, 40 weeks, obese, gbs positive, prolonged rupture of membranes    Postoperative Diagnosis: same but delivered with a viable male infant    Procedure: Procedure(s):  Repeat low transverse  SECTION via pfannenstiel skin incision    Surgeon(s) and Role:     * Jo-Ann Romo MD - Primary     * Derrek Toledo MD - Assisting     Anesthesia: Spinal (epidural not working in the OR)    Findings:  Viable male infant in OP position with double nuchal cord. Apgars 9/9. Clear fluid. Omental adhesion to the lower anterior abdominal wall. Normal tubes/ovaries. Filmy adhesions to the left tube/ovary. Procedure Detail:    The patient was taken to the operating room, where her epidural catheter was removed and spinal anesthesia was administered and found to be adequate. The patient was prepped and draped in the normal sterile fashion. Pfannenstiel skin incision was made with the scalpel and carried down to the underlying fascia. The fascial incision was extended laterally with Zepeda scissors. This fascial incision was grasped with Kocher clamps, tented up, and the underlying rectus muscles were dissected bluntly. The inferior edge of the rectus fascia was grasped with Kocher clamps, tented up, and the underlying rectus muscle was dissected off bluntly. The rectus muscle was divided in the midline bluntly. The peritoneum was entered bluntly with hemostat and extended inferiorly and superiorly with Metzenbaum scissors. The bladder blade was then inserted. The vesicouterine and peritoneum was identified, grasped with pick-ups and entered sharply with Metzenbaum scissors. The bladder flap was then created digitally and the bladder blade was reinserted. A low transverse uterine incision was made with the scalpel and extended laterally with blunt finger dissection. The babys head was then delivered atraumatically. The nose and mouth were suctioned. The cord was clamped and cut and the baby was handed off to the waiting  care unit staff. Placenta was then delivered spontaneously. The uterus was exteriorized and cleared of all clots and debris. The uterine incision was closed in two layers. The first layer with running locked layer of 0 monocryl. The second layer was an imbricating layer of 0 monocryl with good hemostasis assured. The pelvis was washed with warm normal saline. Good hemostasis was again reassured. The paracolic gutters were washed with warm normal saline and the uterus was returned to the abdomen. Good hemostasis was again reassured throughout. Bovie cautery was applied to separate the omental adhesion from the anterior abdominal wall as well as the adhesive bands going to the left tube. The fascia was closed with 0 Vicryl in a running fashion. Good hemostasis was assured. The skin was closed with staples due to significant keloid present. The patient tolerated the procedure well. Sponge, lap, and needle counts were correct times two and the patient was taken to recovery in stable condition.       Estimated Blood Loss:  800    Specimens: * No specimens in log *     Cord Blood Results:  Information for the patient's :  Deshaun Coelho [[id number]]   No results found for: PCTABR, PCTDIG, BILI, ABORH    No results found for: APH, APCO2, APO2, AHCO3, ABEC, ABDC, O2ST, SITE, RSCOM     Prenatal Labs:  Lab Results   Component Value Date/Time    ABO/Rh(D) O POSITIVE 2016 09:39 PM    HBsAg, External Negative 01/10/2018    HIV, External Negative 01/10/2018    Rubella, External immune 01/10/2018    RPR, External Non Reactive 01/10/2018    Gonorrhea, External Negative 01/10/2018    Chlamydia, External Negative 01/10/2018    GrBStrep, External Positive 2018        Signed By:  Maribeth Sanchez MD     2018

## 2018-07-26 NOTE — ANESTHESIA PROCEDURE NOTES
Epidural Block    Performed by: Rosendo Horton  Authorized by: Rosendo Horton     Pre-Procedure  Indication: labor epidural    Preanesthetic Checklist: patient identified, risks and benefits discussed, anesthesia consent, site marked, patient being monitored, timeout performed and anesthesia consent      Epidural:   Patient position:  Seated  Prep region:  Lumbar  Prep: Chlorhexidine    Location:  L3-4    Needle and Epidural Catheter:   Needle Type:  Tuohy  Needle Gauge:  17 G  Injection Technique:  Loss of resistance using air  Attempts:  3 or more  Catheter Size:  19 G  Events: no blood with aspiration, no cerebrospinal fluid with aspiration, no paresthesia and negative aspiration test    Test Dose:  Negative and lidocaine 1.5% w/ epi    Assessment:   Catheter Secured:  Tegaderm and tape  Insertion:  Uncomplicated  Patient tolerance:  Patient tolerated the procedure well with no immediate complications  Initial attempts seated, final attempt left lateral, sterile throughout

## 2018-07-26 NOTE — ANESTHESIA POSTPROCEDURE EVALUATION
Post-Anesthesia Evaluation and Assessment    Patient: Oma Goldberg MRN: [id number]  SSN: [id number]    YOB: 1990  Age: 32 y.o. Sex: female       Cardiovascular Function/Vital Signs  Visit Vitals    /56    Pulse 86    Temp 37.1 °C (98.7 °F)    Resp 18    Ht 5' 4\" (1.626 m)    Wt 119.3 kg (263 lb)    SpO2 100%    Breastfeeding No    BMI 45.14 kg/m2       Patient is status post epidural anesthesia for Procedure(s):   SECTION. Nausea/Vomiting: None    Postoperative hydration reviewed and adequate. Pain:  Pain Scale 1: Labor Algorithm/Pain Intensity (18 0746)  Pain Intensity 1: 0 (18 0508)   Managed    Neurological Status:   Neuro (WDL): Within Defined Limits (18 0802)   At baseline    Mental Status and Level of Consciousness: Arousable    Pulmonary Status:   O2 Device: Room air (18 1218)   Adequate oxygenation and airway patent    Complications related to anesthesia: None    Post-anesthesia assessment completed.  No concerns    Signed By: Alyssa Oneill MD     2018

## 2018-07-26 NOTE — PROGRESS NOTES
Bedside shift change report given to DANA Zepeda RN (oncoming nurse) by Serge Boxer RN (offgoing nurse). Report included the following information SBAR, Kardex, Intake/Output and MAR.     0850 pt voided on chux in bed. 80 Dr Narcisa Frankel at bedside to dose epidural.    1047 Dr Iza Coleman at bedside for prolonged fetal heart rate deceleration, cervical exam remains unchanged. Urgent  section called at this time.

## 2018-07-26 NOTE — ANESTHESIA PREPROCEDURE EVALUATION
Anesthetic History          Comments: H/O prior difficult epidural placement     Review of Systems / Medical History  Patient summary reviewed, nursing notes reviewed and pertinent labs reviewed    Pulmonary  Within defined limits                 Neuro/Psych         Headaches    Comments: Carpal tunnel syndrome on both sides  Severe needle phobia Cardiovascular    Hypertension              Exercise tolerance: <4 METS     GI/Hepatic/Renal     GERD           Endo/Other        Morbid obesity     Other Findings            Physical Exam    Airway  Mallampati: III  TM Distance: 4 - 6 cm  Neck ROM: short neck   Mouth opening: Normal     Cardiovascular  Regular rate and rhythm,  S1 and S2 normal,  no murmur, click, rub, or gallop  Rhythm: regular  Rate: normal         Dental  No notable dental hx       Pulmonary  Breath sounds clear to auscultation               Abdominal  GI exam deferred       Other Findings            Anesthetic Plan    ASA: 3  Anesthesia type: epidural            Anesthetic plan and risks discussed with: Patient and Spouse

## 2018-07-26 NOTE — ANESTHESIA PROCEDURE NOTES
Spinal Block    Start time: 7/26/2018 11:17 AM  End time: 7/26/2018 11:21 AM  Performed by: Edilia Lima  Authorized by: Kaci PRIETO     Pre-procedure:   Indications: primary anesthetic  Preanesthetic Checklist: risks and benefits discussed and timeout performed    Timeout Time: 11:17          Spinal Block:   Patient Position:  Seated    Prep: Betadine      Location:  L3-4  Technique:  Single shot        Needle:   Needle Type:  Pencil-tip  Needle Gauge:  25 G  Attempts:  1      Events: CSF confirmed, no blood with aspiration and no paresthesia        Assessment:  Insertion:  Uncomplicated  Patient tolerance:  Patient tolerated the procedure well with no immediate complications

## 2018-07-26 NOTE — PROGRESS NOTES
Labor Progress Note  Patient seen, fetal heart rate and contraction pattern evaluated. Very uncomfortable with contractions. VSS AF    Physical Exam:  Cervical Exam:5/80-90/-2 slight Caput: IUPC in place  Membranes: SROM  Uterine Activity: Frequency: regular: MVUs still at 130  Fetal Heart Rate: Reactive  Accelerations: yes  Decelerations: variable occasional      Assessment/Plan:  Reassuring fetal status.    Desires Epidural now  Will continue to monitor closely    Ken Hair MD

## 2018-07-26 NOTE — PROGRESS NOTES
Labor Progress Note  Patient seen, fetal heart rate and contraction pattern evaluated, patient examined. Per nursing staff - had pitocin off overnight; restarted now twice this morning and both times baby with significant decels. Visit Vitals    /58    Pulse 93    Temp 99.4 °F (37.4 °C)    Resp 16    Ht 5' 4\" (1.626 m)    Wt 119.3 kg (263 lb)    SpO2 97%    Breastfeeding No    BMI 45.14 kg/m2         Physical Exam:  Cervical Exam:  6/70/-3  Membranes:  Prolonged ROM  Uterine Activity: ctx every 6-7 with coupling  Fetal Heart Rate: 120 mod, no accels; decel x 4 minutes to 80s    Assessment/Plan:  tolac - now with nrfht. Urgent CS called.

## 2018-07-26 NOTE — PROGRESS NOTES
OB Hospitalist    Assumed care from Dr. Paredes Cos. 33 y/o [id number] @ 40 weeks complicated by Chronic Hypertension and  Previous C/S x 1 admitted this morning with SROM desires . Patient had pitocin  augmentation this morning uptil 25milliunits with last cervical exam at 4.30 pm as /-3. Patient desires to have  A break from Pitocin for a couple of hours and would like to walk. Discussed will start Pitocin @ 9 pm. Patient expresses her desire to have Repeat  if no cervical change has been made within 4 hrs of starting pitocin. Risks of  reiterated. Patient verbalizes understanding.       Marcus Montoya MD

## 2018-07-26 NOTE — PROGRESS NOTES
1940- Bedside, Verbal and Written shift change report given to Elliott Lozano RN (oncoming nurse) by DANA Turner RN (offgoing nurse). Report included the following information SBAR, Kardex and Karmen Harvey in room to introduce self and discuss plan of care. Plan to restart pitocin at 2100 2056- pitocin restarted with new bag and tubing. Pt up on birthing ball. 2129- leaning forward, pt on knees. armpits over raised head of bed    2222- throne w/birth bar    0012- side lying pelvic release-left    0019- side lying pelvic release-right    0030- semi fowlers w/wedge    0125- Dr. Chandler Harvey in room to assess. SVE 5/80-90/-2 w/caput. Plan to get epidural and then titrate pitocin to 180 MVUs. 0156-victors in room for epidural placement    0302- finished with epidural placement    0301- left side    0309- right side, peanut ball    0344- pt called out. Voided in bed. Clear yellow fluid noted on chucks. Chucks changed. 4504- st cath. 4964- left side- pt requested break from peanut ball    0528- large amount of clear fluid noted on floor and on chucks. Pt states \"i might have peed\"    0530- right side. Some breakthrough pain. Pt using PCA epidural button. Requests not to use peanut ball until pain is managed. 0602- st cath. SVE: 6-7/90/-1 by Elliott Lozano RN    7210- left side, peanut ball    0640-updated dr Aviva Conley via phone call    6355- hot spot on right side, turned to right side    0723- Dr. Justa Neff in room for epidural redose    0740- Bedside, Verbal and Written shift change report given to DANA Lee (oncoming nurse) by Elliott Lozano RN (offgoing nurse). Report included the following information SBAR, Kardex, Intake/Output and MAR.

## 2018-07-27 LAB
BASOPHILS # BLD: 0 K/UL (ref 0–0.1)
BASOPHILS NFR BLD: 0 % (ref 0–1)
DIFFERENTIAL METHOD BLD: ABNORMAL
EOSINOPHIL # BLD: 0 K/UL (ref 0–0.4)
EOSINOPHIL NFR BLD: 0 % (ref 0–7)
ERYTHROCYTE [DISTWIDTH] IN BLOOD BY AUTOMATED COUNT: 14.6 % (ref 11.5–14.5)
HCT VFR BLD AUTO: 33.2 % (ref 35–47)
HGB BLD-MCNC: 10.6 G/DL (ref 11.5–16)
IMM GRANULOCYTES # BLD: 0 K/UL (ref 0–0.04)
IMM GRANULOCYTES NFR BLD AUTO: 0 % (ref 0–0.5)
LYMPHOCYTES # BLD: 2.7 K/UL (ref 0.8–3.5)
LYMPHOCYTES NFR BLD: 21 % (ref 12–49)
MCH RBC QN AUTO: 27.1 PG (ref 26–34)
MCHC RBC AUTO-ENTMCNC: 31.9 G/DL (ref 30–36.5)
MCV RBC AUTO: 84.9 FL (ref 80–99)
MONOCYTES # BLD: 1 K/UL (ref 0–1)
MONOCYTES NFR BLD: 8 % (ref 5–13)
NEUTS SEG # BLD: 9 K/UL (ref 1.8–8)
NEUTS SEG NFR BLD: 71 % (ref 32–75)
NRBC # BLD: 0 K/UL (ref 0–0.01)
NRBC BLD-RTO: 0 PER 100 WBC
PLATELET # BLD AUTO: 332 K/UL (ref 150–400)
PMV BLD AUTO: 10.6 FL (ref 8.9–12.9)
RBC # BLD AUTO: 3.91 M/UL (ref 3.8–5.2)
WBC # BLD AUTO: 12.7 K/UL (ref 3.6–11)

## 2018-07-27 PROCEDURE — 85025 COMPLETE CBC W/AUTO DIFF WBC: CPT | Performed by: OBSTETRICS & GYNECOLOGY

## 2018-07-27 PROCEDURE — 65410000002 HC RM PRIVATE OB

## 2018-07-27 PROCEDURE — 94760 N-INVAS EAR/PLS OXIMETRY 1: CPT

## 2018-07-27 PROCEDURE — 74011250636 HC RX REV CODE- 250/636: Performed by: OBSTETRICS & GYNECOLOGY

## 2018-07-27 PROCEDURE — 74011250637 HC RX REV CODE- 250/637: Performed by: OBSTETRICS & GYNECOLOGY

## 2018-07-27 PROCEDURE — 36415 COLL VENOUS BLD VENIPUNCTURE: CPT | Performed by: OBSTETRICS & GYNECOLOGY

## 2018-07-27 RX ORDER — HYDROMORPHONE HYDROCHLORIDE 4 MG/1
4 TABLET ORAL
Status: DISCONTINUED | OUTPATIENT
Start: 2018-07-27 | End: 2018-07-28 | Stop reason: HOSPADM

## 2018-07-27 RX ADMIN — OXYCODONE AND ACETAMINOPHEN 2 TABLET: 5; 325 TABLET ORAL at 14:48

## 2018-07-27 RX ADMIN — ACETAMINOPHEN 650 MG: 325 TABLET, FILM COATED ORAL at 21:17

## 2018-07-27 RX ADMIN — OXYCODONE AND ACETAMINOPHEN 2 TABLET: 5; 325 TABLET ORAL at 19:20

## 2018-07-27 RX ADMIN — NALBUPHINE HYDROCHLORIDE 10 MG: 10 INJECTION, SOLUTION INTRAMUSCULAR; INTRAVENOUS; SUBCUTANEOUS at 03:00

## 2018-07-27 RX ADMIN — DOCUSATE SODIUM 100 MG: 100 CAPSULE, LIQUID FILLED ORAL at 10:54

## 2018-07-27 RX ADMIN — OXYCODONE AND ACETAMINOPHEN 2 TABLET: 5; 325 TABLET ORAL at 10:54

## 2018-07-27 RX ADMIN — HYDROMORPHONE HYDROCHLORIDE 4 MG: 4 TABLET ORAL at 23:26

## 2018-07-27 RX ADMIN — OXYCODONE AND ACETAMINOPHEN 2 TABLET: 5; 325 TABLET ORAL at 06:42

## 2018-07-27 NOTE — LACTATION NOTE
This note was copied from a baby's chart. Mother reports baby has learned to latch but has been sleepy during his first 24 hours of life. Mother observed giving baby pacifier. Breastfeeding Mother educated on the risks of pacifiers and artificial nipples before breastfeeding is well established, concerns discussed, solutions offered. Mother expressed concerns about baby getting enough to eat but declined offer of assistance today. She said she would prefer to have consult tomorrow. Beginning breastfeeding topics discussed.

## 2018-07-27 NOTE — PROGRESS NOTES
Post-Operative  Day 1 [place] Information for the patient's :  Avtar Nuñez [[id number]] , Low Transverse Patient doing well without unusual complaints. Tolerating liquids, no flatus. Denies ha/sob/cp/palpitations/ruqp/change in vision. Vitals: 
Visit Vitals  /75 (BP 1 Location: Left arm, BP Patient Position: At rest)  Pulse 78  Temp 98.3 °F (36.8 °C)  Resp 16  
 Ht 5' 4\" (1.626 m)  Wt 119.3 kg (263 lb)  SpO2 100%  Breastfeeding Unknown  BMI 45.14 kg/m2 Temp (24hrs), Av.4 °F (36.9 °C), Min:98.2 °F (36.8 °C), Max:98.7 °F (37.1 °C) Last 24hr Input/Output: 
 
Intake/Output Summary (Last 24 hours) at 18 7305 Last data filed at 18 7578 Gross per 24 hour Intake                0 ml Output             3750 ml Net            -3750 ml Exam:   
   Patient without distress. Abdomen:soft, expected tenderness, fundus firm, wound vac intact Lower extremities are nontender with trace edema. No cords Labs:  
Lab Results Component Value Date/Time  WBC 8.3 2018 09:45 AM  
 WBC 5.3 2017 11:25 AM  
 WBC 6.7 2017 02:28 PM  
 WBC 7.1 2017 12:07 PM  
 WBC 10.9 2016 05:37 AM  
 WBC 7.6 2016 10:51 AM  
 WBC 7.3 2016 12:00 AM  
 WBC 9.3 2015 12:00 AM  
 WBC 8.6 2015 12:00 AM  
 WBC 6.7 2015 01:15 PM  
 WBC 8.4 2014 07:30 PM  
 WBC 8.5 2014 01:30 PM  
 HGB 10.6 (L) 2018 09:45 AM  
 HGB 12.3 2017 11:25 AM  
 HGB 11.9 2017 02:28 PM  
 HGB 12.0 2017 12:07 PM  
 HGB 10.6 (L) 2016 05:37 AM  
 HGB 12.0 2016 10:51 AM  
 HGB 11.8 2016 12:00 AM  
 HGB 11.3 2015 12:00 AM  
 HGB 11.7 2015 12:00 AM  
 HGB 12.2 2015 01:15 PM  
 HGB 11.2 (L) 2014 07:30 PM  
 HGB 11.6 2014 01:30 PM  
 HCT 33.0 (L) 2018 09:45 AM  
 HCT 39.1 2017 11:25 AM  
 HCT 36.4 05/08/2017 02:28 PM  
 HCT 37.2 03/31/2017 12:07 PM  
 HCT 32.2 (L) 02/09/2016 05:37 AM  
 HCT 35.8 02/08/2016 10:51 AM  
 HCT 35.2 02/03/2016 12:00 AM  
 HCT 33.8 (L) 11/22/2015 12:00 AM  
 HCT 36.0 07/27/2015 12:00 AM  
 HCT 37.3 06/18/2015 01:15 PM  
 HCT 33.7 (L) 05/24/2014 07:30 PM  
 HCT 35.7 02/22/2014 01:30 PM  
 PLATELET 799 78/81/5818 09:45 AM  
 PLATELET 815 (H) 28/94/8127 11:25 AM  
 PLATELET 433 (H) 95/63/9700 02:28 PM  
 PLATELET 137 (H) 11/24/8234 12:07 PM  
 PLATELET 003 56/38/8504 05:37 AM  
 PLATELET 781 59/98/1660 10:51 AM  
 PLATELET 022 77/55/3293 12:00 AM  
 PLATELET 240 67/33/9460 12:00 AM  
 PLATELET 575 (H) 02/56/7124 12:00 AM  
 PLATELET 270 (H) 66/59/2788 01:15 PM  
 PLATELET 405 (H) 48/73/2877 07:30 PM  
 PLATELET 940 89/52/9509 01:30 PM  
 Hgb, External 11.7 07/27/2015 Hct, External 36.0 07/27/2015 Platelet cnt., External 404 07/27/2015 No results found for this or any previous visit (from the past 24 hour(s)). Assessment: Post-Op day 1 s/p RLTCS after failed TOLAC. O+/RI/male infant. Course c/b CHTN, pt normotensive without medication. Informed consent obtained for circumcision. Continue routine post op care, anticipate DC home POD 2-3. Signed By: Edd Oppenheim, DO   
 July 27, 2018

## 2018-07-27 NOTE — PROGRESS NOTES
Anesthesia Post Operative Day 1 The patient is status post C Section with epidural anesthesia and Duramorph for pain. The patient relates the following scales: pain,mild ; itching, mild ; nausea, none. All sympyoms were treated with medications per protocol. The patient is up and ambulating and has minimal complaints. Plan: Continue to treat breakthrough symptoms as needed with protocol medictions.

## 2018-07-27 NOTE — PROGRESS NOTES
Bedside shift change report given to Edith Duffy RN (oncoming nurse) by Sebas Shah RN (offgoing nurse). Report included the following information SBAR.

## 2018-07-27 NOTE — ROUTINE PROCESS
2000- Bedside shift change report given to ALONZO Barrera RN (oncoming nurse) by Natasha Bravo RN (offgoing nurse). Report included the following information SBAR.

## 2018-07-27 NOTE — WOUND CARE
WOCN Note:  
 
New consult for incisional NPWT. Chart reviewed. Admitted DX:  S/P low transversion C Section Assessment:  
Patient is A&O x 3, communicative, continent and mobile. Bed: total care Patient reports no pain. 1. Low transverse  site:  Incisional NPWT in place with black foam at 125 mmHg continuously. Recommendations:   
VAC (NPWT) Dressing Management Orders: 
Settings at 125 mmHg, continuous negative pressure. Incisional dressing of black foam 
Dressing to remain in place until discharge or about 5 days. Change canisters when full using only 500cc cannister. (located in 5E, PSBU and Main ICU supply rooms). Measure amount of drainage Qshift. For sudden development of blood or an increased amount of va blood: 1. Immediately turn off VAC system. 2.  Leave dressing in place. 3.  Hold pressure over wound. 4.  Call physician. If vacuum fails to maintain seal or unable to manage alarm for clogged tubing for >2hrs:   
 1. Notify Physician and WOCN that wound VAC dressing needs to be reapplied 1. Remove dressing and all foam from wound 2. Clean skin with normal saline. 3.  Apply Aquacel AG Surgical dressing unless physician wants to assess incision or in case of dehiscence. When patient is discharged from our facility: 1. Same as above for dressing removal and wound care. 2.  Remove cannister & place in trash. Place VAC and power cord in utility room. (do not place in bag) 3. Wound VAC cannot leave the facility with the patient. For procedures only: · Patient may be disconnected from [facility] system for less than 2 hours by clamping and disconnecting tubing. VAC machine cannot go into MRI area. · Battery backup on our current inpatient systems lasts for 4 hours and may be used to prevent interruption of treatment. Skin Care & Pressure Prevention: 
Minimize layers of linen/pads under patient to optimize support surface.    
Turn/reposition approximately every 2 hours and offload heels. Discussed above plan with patient and RN. Transition of Care: Plan to follow weekly and as needed while admitted to hospital. 
 
AMISHA VarnerN RN [facility], [facility]. Wound Care Office [phone] Pager [phone]

## 2018-07-28 VITALS
TEMPERATURE: 98.4 F | RESPIRATION RATE: 20 BRPM | HEIGHT: 64 IN | HEART RATE: 94 BPM | SYSTOLIC BLOOD PRESSURE: 142 MMHG | OXYGEN SATURATION: 98 % | DIASTOLIC BLOOD PRESSURE: 90 MMHG | WEIGHT: 263 LBS | BODY MASS INDEX: 44.9 KG/M2

## 2018-07-28 PROCEDURE — 74011250637 HC RX REV CODE- 250/637: Performed by: OBSTETRICS & GYNECOLOGY

## 2018-07-28 PROCEDURE — 77030012935 HC DRSG AQUACEL BMS -B

## 2018-07-28 RX ORDER — HYDROMORPHONE HYDROCHLORIDE 4 MG/1
4 TABLET ORAL
Qty: 20 TAB | Refills: 0 | Status: ON HOLD | OUTPATIENT
Start: 2018-07-28 | End: 2019-05-06

## 2018-07-28 RX ADMIN — ACETAMINOPHEN 650 MG: 325 TABLET, FILM COATED ORAL at 03:09

## 2018-07-28 RX ADMIN — ACETAMINOPHEN 650 MG: 325 TABLET, FILM COATED ORAL at 14:19

## 2018-07-28 RX ADMIN — SIMETHICONE CHEW TAB 80 MG 80 MG: 80 TABLET ORAL at 06:16

## 2018-07-28 RX ADMIN — HYDROMORPHONE HYDROCHLORIDE 4 MG: 4 TABLET ORAL at 03:32

## 2018-07-28 RX ADMIN — HYDROMORPHONE HYDROCHLORIDE 4 MG: 4 TABLET ORAL at 07:45

## 2018-07-28 RX ADMIN — DOCUSATE SODIUM 100 MG: 100 CAPSULE, LIQUID FILLED ORAL at 07:45

## 2018-07-28 RX ADMIN — ACETAMINOPHEN 650 MG: 325 TABLET, FILM COATED ORAL at 09:33

## 2018-07-28 RX ADMIN — HYDROMORPHONE HYDROCHLORIDE 4 MG: 4 TABLET ORAL at 12:51

## 2018-07-28 NOTE — ROUTINE PROCESS
Bedside shift change report given to Oscar Silva RNC (oncoming nurse) by Lisette Franco RN (offgoing nurse). Report included the following information SBAR.  
 
1600-Pt discharged home with family. Discharge instructions reviewed. Pt verbalized understanding. F/u appt on Friday with Dr. Tatianna Friedman. Per Dr. Torito Yung dressing removed and aquacel pressure dressing applied.

## 2018-07-28 NOTE — LACTATION NOTE
This note was copied from a baby's chart. Mother again declines [facility] consult. She stated that nurse helped her overnight and that baby is feeding better today. She agrees to call for assistance as needed.

## 2018-07-28 NOTE — PROGRESS NOTES
Progress Note Patient: Cora Rios MRN: [id number]  SSN: [id number] YOB: 1990  Age: 32 y.o. Sex: female 2 Days Post-Op Subjective:  
 
Patient doing well postpartum without significant complaints. Voiding without difficulty. Passing flatus. Patient reports normal lochia. . Breastfeeding: Yes Desires early discharge. Objective:  
 
Patient Vitals for the past 18 hrs: 
 Temp Pulse Resp BP SpO2  
18 0921 99 °F (37.2 °C) 96 16 141/86 99 % 18 0330 97.6 °F (36.4 °C) 93 16 135/84 -  
18 2105 98.6 °F (37 °C) 94 16 150/83 - Temp (24hrs), Av.4 °F (36.9 °C), Min:97.6 °F (36.4 °C), Max:99 °F (37.2 °C) Physical Exam:   
Patient without distress. Heart: Regular rate and rhythm or S1S2 present Lung: clear to auscultation throughout lung fields, no wheezes, no rales, no rhonchi and normal respiratory effort Abdomen: soft, nontender Incision: wound vac clean and intact, suctioning appropriately Fundus: firm and non tender Lower Extremities: no calf tenderness Lab/Data Review: CBC:   
Recent Labs  
   18 
 3119 WBC  12.7* HGB  10.6* HCT  33.2*  
PLT  332 Assessment and Plan:  
 
Patient appears to be having an uncomplicated post- course. Continue routine postoperative care and maternal education. and Will discharge home today. Boy- s/p circ CHTN- no meds- BPs mild range Will remove wound vac and place aquacel dressing. F/u in office in 1 week for staple removal. 
 
Signed By: Michelle Redding MD   
 2018

## 2018-07-28 NOTE — DISCHARGE SUMMARY
Obstetrical Discharge Summary     Name: Josué Aldana MRN: [id number]  SSN: [id number]    YOB: 1990  Age: 32 y.o. Sex: female      Allergies: Tomato; Citrus flavor; Ibuprofen; and Seafood [shellfish containing products]    Admit Date: 2018    Discharge Date: 2018     Admitting Physician: Earline Blevins MD     Attending Physician:  Shannan Watkins MD     * Admission Diagnoses: PROM (premature rupture of membranes)    * Discharge Diagnoses:   Information for the patient's :  Vic Carreno [[id number]]   Delivery of a 3.475 kg male infant via , Low Transverse on 2018 at 11:37 AM  by . Apgars were 9 and 9.        Additional Diagnoses:   Hospital Problems as of 2018  Date Reviewed: 2017          Codes Class Noted - Resolved POA    PROM (premature rupture of membranes) ICD-10-CM: O42.90  ICD-9-CM: 658.10  2018 - Present Unknown             Lab Results   Component Value Date/Time    ABO/Rh(D) O POSITIVE 2016 09:39 PM    Rubella, External immune 01/10/2018    GrBStrep, External Positive 2018    ABO,Rh O Positive 01/10/2018      Immunization History   Administered Date(s) Administered    Influenza Vaccine 2015    MMR 2015    Tdap 2016       * Procedures:  delivery  Procedure(s):   SECTION      [place]  Depression Scale  I have been able to laugh and see the funny side of things: As much as I always could  I have looked forward with enjoyment to things: As much as I ever did  I have blamed myself unnecessarily when things went wrong: No, never  I have been anxious or worried for no good reason: Yes, sometimes  I have felt scared or panicky for no very good reason: No, not at all  Things have been getting on top of me: Yes, most of the time I haven't been able to cope at all  I have been so unhappy that I have had difficulty sleeping: No, not at all  I have felt sad or miserable: No, not at all  I have been so unhappy that I have been crying: No, never  The thought of harming myself has occurred to me: Never  Total Score: 5    * Discharge Condition: stable    * Hospital Course: Normal hospital course following the delivery. * Disposition: Home    Discharge Medications:   Current Discharge Medication List      START taking these medications    Details   HYDROmorphone (DILAUDID) 4 mg tablet Take 1 Tab by mouth every four (4) hours as needed. Max Daily Amount: 24 mg. Qty: 20 Tab, Refills: 0    Associated Diagnoses: Pain         CONTINUE these medications which have NOT CHANGED    Details   IRON, FERROUS SULFATE, PO Take 1 Tab by mouth daily. prenatal vit-iron fumarate-fa 27 mg iron- 0.8 mg tab tablet Take 1 Tab by mouth daily. Qty: 30 Tab, Refills: 6      metFORMIN (GLUCOPHAGE) 500 mg tablet Take 1 Tab by mouth daily (with breakfast). Qty: 30 Tab, Refills: 6    Associated Diagnoses: PCOS (polycystic ovarian syndrome); IGT (impaired glucose tolerance)      albuterol (PROVENTIL HFA, VENTOLIN HFA, PROAIR HFA) 90 mcg/actuation inhaler Take 1 Puff by inhalation every six (6) hours as needed for Wheezing. Qty: 1 Inhaler, Refills: 0         STOP taking these medications       aspirin 81 mg chewable tablet Comments:   Reason for Stopping:               * Follow-up Care/Patient Instructions: Activity: No sex for 6 weeks and No driving while on analgesics  Diet: Regular Diet  Wound Care: As directed    Follow-up Information     [facility] With Details [facility]   [address] [place] [address]  [phone]             Signed By:  Wes Hoffman MD     July 28, 2018

## 2018-07-28 NOTE — ROUTINE PROCESS
Bedside shift change report given to Matilda Welch RN (oncoming nurse) by Zaida Duval RN (offgoing nurse). Report included the following information SBAR, Kardex, Procedure Summary, Intake/Output, MAR and Recent Results.

## 2018-07-28 NOTE — DISCHARGE INSTRUCTIONS
POSTPARTUM DISCHARGE INSTRUCTIONS       Name:  Kvng Bolden  YOB: 1990  Admission Diagnosis:  PROM (premature rupture of membranes)     Discharge Diagnosis:    Problem List as of 2018  Date Reviewed: 2017          Codes Class Noted - Resolved    PROM (premature rupture of membranes) ICD-10-CM: O42.90  ICD-9-CM: 658.10  2018 - Present        PCOS (polycystic ovarian syndrome) ICD-10-CM: E28.2  ICD-9-CM: 256.4  2017 - Present        Obesity, morbid, BMI 40.0-49.9 ([facility] ICD-10-CM: E66.01  ICD-9-CM: 278.01  3/31/2017 - Present        Pregnancy ICD-10-CM: Z34.90  ICD-9-CM: V22.2  2016 - Present         delivery delivered ICD-10-CM: O82  ICD-9-CM: 669.71  2016 - Present        Encounter for full-term uncomplicated delivery OSW-36-TP: O80  ICD-9-CM: 650  2016 - Present        GBS (group B Streptococcus carrier), +RV culture, currently pregnant ICD-10-CM: O99.820  ICD-9-CM: V23.89, V02.51  2016 - Present        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  ICD-9-CM: 300.29  2015 - Present        Elevated blood pressure (not hypertension) ICD-10-CM: R03.0  ICD-9-CM: 796.2  2015 - Present        Prediabetes ICD-10-CM: R73.03  ICD-9-CM: 790.29  2015 - Present        HTN, age 0-24 ICD-10-CM: I10  ICD-9-CM: 401.9  2015 - Present        HA (headache) ICD-10-CM: R51  ICD-9-CM: 784.0  3/20/2015 - Present        Carpal tunnel syndrome on both sides ICD-10-CM: G56.03  ICD-9-CM: 354.0  3/20/2015 - Present        BMI 39.0-39.9,adult ICD-10-CM: Z68.39  ICD-9-CM: V85.39  3/20/2015 - Present        Family planning ICD-10-CM: Z30.09  ICD-9-CM: V25.09  3/20/2015 - Present        RESOLVED: HTN (hypertension) ICD-10-CM: I10  ICD-9-CM: 401.9  3/20/2015 - 2015            Attending Physician:  Carlos Mccollum MD    Delivery Type:   Section: What to Expect at Home    Your Recovery:  A  section, or , is surgery to deliver your baby through a cut, called an incision that the doctor makes in your lower belly and uterus. You may have some pain in your lower belly and need pain medicine for 1 to 2 weeks. You can expect some vaginal bleeding for several weeks. You will probably need about 6 weeks to fully recover. It is important to take it easy while the incision is healing. Avoid heavy lifting, strenuous activities, or exercises that strain the belly muscles while you are recovering. Ask a family member or friend for help with housework, cooking, and shopping. This care sheet gives you a general idea about how long it will take for you to recover. But each person recovers at a different pace. Follow the steps below to get better as quickly as possible. How can you care for yourself at home? Activity  · Rest when you feel tired. Getting enough sleep will help you recover. · Try to walk each day. Start by walking a little more than you did the day before. Bit by bit, increase the amount you walk. Walking boosts blood flow and helps prevent pneumonia, constipation, and blood clots. · Avoid strenuous activities, such as bicycle riding, jogging, weightlifting, and aerobic exercise, for 6 weeks or until your doctor says it is okay. · Until your doctor says it is okay, do not lift anything heavier than your baby. · Do not do sit-ups or other exercise that strain the belly muscles for 6 weeks or until your doctor says it is okay. · Hold a pillow over your incision when you cough or take deep breaths. This will support your belly and decrease your pain. · You may shower as usual. Pat the incision dry when you are done. · You will have some vaginal bleeding. Wear sanitary pads. Do not douche or use tampons until your doctor says it is okay. · Ask your doctor when you can drive again. · You will probably need to take at least 6 weeks off work. It depends on the type of work you do and how you feel.   · Wait until you are healed (about 4 to 6 weeks) before you have sexual intercourse. Your doctor will tell you when it is okay to have sex. · Talk to your doctor about birth control. You can get pregnant even before your period returns. Also, you can get pregnant while you are breast-feeding. Incision care  Your skin is your body's first line of defense against germs, but an incision site leaves an easy way for germs to enter your body. To prevent infection:  · Clean your hands frequently and before and after changing any touching any dressings. · If you have strips of tape on the incision, leave the tape on for a week or until it falls off. · Look at your incision closely every day for any changes. Contact your doctor if you experience any signs of infection, such as fever or increased redness at the surgical site. · Wash the area daily with warm, soapy water, and pat it dry. Don't use hydrogen peroxide or alcohol, which can slow healing. You may cover the area with a gauze bandage if it weeps or rubs against clothing. Change the bandage every day. · Keep the area clean and dry. Diet  · You can eat your normal diet. If your stomach is upset, try bland, low-fat foods like plain rice, broiled chicken, toast, and yogurt. · Drink plenty of fluids (unless your doctor tells you not to). · You may notice that your bowel movements are not regular right after your surgery. This is common. Try to avoid constipation and straining with bowel movements. You may want to take a fiber supplement every day. If you have not had a bowel movement after a couple of days, ask your doctor about taking a mild laxative. · If you are breast-feeding, do not drink any alcohol. Medicines  · Take pain medicines exactly as directed. · If the doctor gave you a prescription medicine for pain, take it as prescribed.   · If you are not taking a prescription pain medicine, ask your doctor if you can take an over-the-counter medicine such as acetaminophen (Tylenol), ibuprofen (Advil, Motrin), or naproxen (Aleve), for cramps. Read and follow all instructions on the label. Do not take aspirin, because it can cause more bleeding. Do not take acetaminophen (Tylenol) and other acetaminophen containing medications (i.e. Percocet) at the same time. · If you think your pain medicine is making you sick to your stomach:  · Take your medicine after meals (unless your doctor has told you not to). · Ask your doctor for a different pain medicine. · If your doctor prescribed antibiotics, take them as directed. Do not stop taking them just because you feel better. You need to take the full course of antibiotics. Mental Health  · Many women get the \"baby blues\" during the first few days after childbirth. You may lose sleep, feel irritable, and cry easily. You may feel happy one minute and sad the next. Hormone changes are one cause of these emotional changes. Also, the demands of a new baby, along with visits from relatives or other family needs, add to a mother's stress. The \"baby blues\" often peak around the fourth day. Then they ease up in less than 2 weeks. · If your moodiness or anxiety lasts for more than 2 weeks, or if you feel like life is not worth living, you may have postpartum depression. This is different for each mother. Some mothers with serious depression may worry intensely about their infant's well-being. Others may feel distant from their child. Some mothers might even feel that they might harm their baby. A mother may have signs of paranoia, wondering if someone is watching her. · With all the changes in your life, you may not know if you are depressed. Pregnancy sometimes causes changes in how you feel that are similar to the symptoms of depression. · Symptoms of depression include:  · Feeling sad or hopeless and losing interest in daily activities. These are the most common symptoms of depression. · Sleeping too much or not enough. · Feeling tired.  You may feel as if you have no energy. · Eating too much or too little. · [employer]PARTUM [employer] (PSI) offers a Warm line; Chat with the Expert phone sessions; Information and Articles about Pregnancy and Postpartum Mood Disorders; Comprehensive List of Free Support Groups; Knowledgeable local coordinators who will offer support, information, and resources; Guide to Resources on [employer]; Calendar of events in the  mood disorders community; Latest News and Research; and [facility] [address] for [employer]. Remember - You are not alone; You are not to blame; With help, you will be well. 7-981-764-PPD(4773). WWW. POSTPARTUM. NET   · Writing or talking about death, such as writing suicide notes or talking about guns, knives, or pills. Keep the numbers for these national suicide hotlines: 8-886-522-TALK (4-171.551.3102) and 1-514-FXXWPVT (2-379.121.4087). If you or someone you know talks about suicide or feeling hopeless, get help right away. Other instructions  · If you breast-feed your baby, you may be more comfortable while you are healing if you place the baby so that he or she is not resting on your belly. Try tucking your baby under your arm, with his or her body along the side you will be feeding on. Support your baby's upper body with your arm. With that hand you can control your baby's head to bring his or her mouth to your breast. This is sometimes called the football hold. Follow-up care is a key part of your treatment and safety. Be sure to make and go to all appointments, and call your doctor if you are having problems. It's also a good idea to know your test results and keep a list of the medicines you take. When should you call for help? Call 911 anytime you think you may need emergency care. For example, call if:  · You are thinking of hurting yourself, your baby, or anyone else. · You passed out (lost consciousness).   · You have symptoms of a blood clot in your lung (called a pulmonary embolism). These may include:  · Sudden chest pain. · Trouble breathing. · Coughing up blood. Call your doctor now or seek immediate medical care if:    · You have severe vaginal bleeding. · You are soaking through a pad each hour for 2 or more hours. · Your vaginal bleeding seems to be getting heavier or is still bright red 4 days after delivery. · You are dizzy or lightheaded, or you feel like you may faint. · You are vomiting or cannot keep fluids down. · You have a fever. · You have new or more belly pain. · You have loose stitches, or your incision comes open. · You have symptoms of infection, such as:  · Increased pain, swelling, warmth, or redness. · Red streaks leading from the incision. · Pus draining from the incision. · A fever  · You pass tissue (not just blood). · Your vaginal discharge smells bad. · Your belly feels tender or full and hard. · Your breasts are continuously painful or red. · You feel sad, anxious, or hopeless for more than a few days. · You have sudden, severe pain in your belly. · You have symptoms of a blood clot in your leg (called a deep vein thrombosis), such as:  · Pain in your calf, back of the knee, thigh, or groin. · Redness and swelling in your leg or groin. · You have symptoms of preeclampsia, such as:  · Sudden swelling of your face, hands, or feet. · New vision problems (such as dimness or blurring). · A severe headache. · Your blood pressure is higher than it should be or rises suddenly. · You have new nausea or vomiting. Watch closely for changes in your health, and be sure to contact your doctor if you have any problems. Additional Information:  Learning About Hypertensive Disorders After Childbirth    What is preeclampsia? A woman with preeclampsia has blood pressure that is higher than usual. She may also have other serious symptoms. Preeclampsia can be dangerous.  When it is severe, it can cause seizures (eclampsia) or liver or kidney damage. When the liver is affected, some women get HELLP syndrome, a blood-clotting and bleeding problem. HELLP can come on quickly and can be deadly. This is why your doctor checks you and your baby often. Preeclampsia usually occurs after 20 weeks of pregnancy. In rare cases, it is first noted right after childbirth. Most often, it starts near the end of pregnancy and goes away after childbirth. What are the symptoms? Mild preeclampsia usually doesn't cause symptoms. But preeclampsia can cause rapid weight gain and sudden swelling of the hands and face. Severe preeclampsia does cause symptoms. It can cause a very bad headache and trouble seeing and breathing. It also can cause belly pain. You may also urinate less than usual.    If you have new preeclampsia symptoms after you go home from the hospital, call your doctor right away. What can you expect after you have had preeclampsia? In the hospital  After the baby and the placenta are delivered, preeclampsia usually starts to improve. Most women get better in the first few days after childbirth. After having preeclampsia, you still have a risk of seizures for a day or more after childbirth. (Very rarely, seizures happen later on.) So your doctor may have you take magnesium sulfate for a day or more to prevent seizures. You may also take medicine to lower your blood pressure. When you go home  Your blood pressure will most likely return to normal a few days after delivery. Your doctor will want to check your blood pressure sometime in the first week after you leave the hospital.    Some women still have high blood pressure 6 weeks after childbirth. But most return to normal levels over the long term. · Take and record your blood pressure at home if your doctor tells you to. · Learn the importance of the two measures of blood pressure (such as 120 over 80, or 120/80).  The first number is the systolic pressure. This is the force of blood on the artery walls as the heart pumps. The second number is the diastolic pressure. This is the force of blood on the artery walls between heartbeats, when the heart is at rest. You have a choice of monitors to use. Manual monitor: You pump up the cuff and use a stethoscope to listen for your  Pulse. · Electronic monitor: The cuff inflates, and a gauge shows your pulse rate. · To take your blood pressure:  · Ask your doctor to check your blood pressure monitor to be sure that it is accurate and that the cuff fits you. Also ask your doctor to watch you use it, to make sure that you are using it right. · You should not eat, use tobacco products, or use medicine known to raise blood pressure (such as some nasal decongestant sprays) before you take your blood pressure. · Avoid taking your blood pressure if you have just exercised or are nervous or upset. Rest at least 15 minutes before you take your blood pressure. · Be safe with medicines. If you take medicine, take it exactly as prescribed. Call your doctor if you think you are having a problem with your medicine. · Do not smoke. Quitting smoking will help lower your blood pressure and improve your baby's growth and health. If you need help quitting, talk to your doctor about stop-smoking programs and medicines. These can increase your chances of quitting for good. · Eat a balanced and healthy diet that has lots of fruits and vegetables. Long-term health   After you have had preeclampsia, you have a higher-than-average risk of heart disease, stroke, and kidney disease. This may be because the same things that cause preeclampsia also cause heart and kidney disease. To protect your health, work with your doctor on living a heart-healthy lifestyle and getting the checkups you need. Your doctor may also want you to check your blood pressure at home. Follow-up care is a key part of your treatment and safety.  Be sure to make and go to all appointments, and call your doctor if you are having problems. It's also a good idea to know your test results and keep a list of the medicines you take. These are general instructions for a healthy lifestyle:    No smoking/ No tobacco products/ Avoid exposure to second hand smoke    Surgeon General's Warning:  Quitting smoking now greatly reduces serious risk to your health. Obesity, smoking, and sedentary lifestyle greatly increases your risk for illness    A healthy diet, regular physical exercise & weight monitoring are important for maintaining a healthy lifestyle    Recognize signs and symptoms of STROKE:    F-face looks uneven    A-arms unable to move or move unevenly    S-speech slurred or non-existent    T-time-call 911 as soon as signs and symptoms begin - DO NOT go       back to bed or wait to see if you get better - TIME IS BRAIN. I have had the opportunity to make my options or choices for discharge. I have received and understand these instructions.

## 2019-05-06 ENCOUNTER — APPOINTMENT (OUTPATIENT)
Dept: ULTRASOUND IMAGING | Age: 29
End: 2019-05-06
Attending: OBSTETRICS & GYNECOLOGY
Payer: MEDICAID

## 2019-05-06 ENCOUNTER — HOSPITAL ENCOUNTER (EMERGENCY)
Age: 29
Discharge: HOME | End: 2019-05-06
Attending: OBSTETRICS & GYNECOLOGY | Admitting: OBSTETRICS & GYNECOLOGY
Payer: MEDICAID

## 2019-05-06 VITALS
TEMPERATURE: 98.5 F | DIASTOLIC BLOOD PRESSURE: 62 MMHG | HEART RATE: 89 BPM | WEIGHT: 255 LBS | RESPIRATION RATE: 16 BRPM | SYSTOLIC BLOOD PRESSURE: 124 MMHG | HEIGHT: 64 IN | BODY MASS INDEX: 43.54 KG/M2

## 2019-05-06 LAB
APPEARANCE UR: ABNORMAL
BACTERIA URNS QL MICRO: ABNORMAL /HPF
BILIRUB UR QL: NEGATIVE
COLOR UR: ABNORMAL
EPITH CASTS URNS QL MICRO: ABNORMAL /LPF
GLUCOSE UR STRIP.AUTO-MCNC: NEGATIVE MG/DL
HGB UR QL STRIP: NEGATIVE
KETONES UR QL STRIP.AUTO: NEGATIVE MG/DL
LEUKOCYTE ESTERASE UR QL STRIP.AUTO: ABNORMAL
NITRITE UR QL STRIP.AUTO: NEGATIVE
PH UR STRIP: 7.5 [PH] (ref 5–8)
PROT UR STRIP-MCNC: NEGATIVE MG/DL
RBC #/AREA URNS HPF: ABNORMAL /HPF (ref 0–5)
SP GR UR REFRACTOMETRY: 1.02 (ref 1–1.03)
UA: UC IF INDICATED,UAUC: ABNORMAL
UROBILINOGEN UR QL STRIP.AUTO: 1 EU/DL (ref 0.2–1)
WBC URNS QL MICRO: ABNORMAL /HPF (ref 0–4)

## 2019-05-06 PROCEDURE — 99283 EMERGENCY DEPT VISIT LOW MDM: CPT

## 2019-05-06 PROCEDURE — 94760 N-INVAS EAR/PLS OXIMETRY 1: CPT

## 2019-05-06 PROCEDURE — 87086 URINE CULTURE/COLONY COUNT: CPT

## 2019-05-06 PROCEDURE — 76815 OB US LIMITED FETUS(S): CPT

## 2019-05-06 PROCEDURE — 87186 SC STD MICRODIL/AGAR DIL: CPT

## 2019-05-06 PROCEDURE — 87077 CULTURE AEROBIC IDENTIFY: CPT

## 2019-05-06 PROCEDURE — 81001 URINALYSIS AUTO W/SCOPE: CPT

## 2019-05-06 RX ORDER — TERBUTALINE SULFATE 1 MG/ML
0.25 INJECTION SUBCUTANEOUS AS NEEDED
Status: DISCONTINUED | OUTPATIENT
Start: 2019-05-06 | End: 2019-05-07 | Stop reason: HOSPADM

## 2019-05-06 NOTE — PROGRESS NOTES
1831: Patient arrived ambulatory from home with complaints of cramping for the past two weeks. Patient has not seen an OB this pregnancy. Denies any LOF or bleeding and reports good fetal movement. 1852: Dr. Yissel Romeo aware of the patients arrival.  
 
1915: Received orders for an abdominal ultrasound and UA.  
 
1935: Bedside shift change report given to JUSTINE Lieberman RN (oncoming nurse) by Daisy Matos RN (offgoing nurse). Report included the following information SBAR, MAR and Recent Results.

## 2019-05-07 NOTE — H&P
Labor and Delivery Triage Note 2019 Patient is a 29 y.o. G4 P 2012 23w3d by LMP and sono today      who presents with c/o lower abdominal cramping and burning since last night at 1831 . Pt describes pain as intermittent and lasting for about a hour. Pain returns after several hours. Denies urinary complaints, N/V/D, vaginal bleeding, vaginal d/c. Pt states pain is severe at times. Pt feels better today. + FM. Pt has not been seen for prenatal care yet due to issues with Medicaid but plans to be seen soon with Dr Mili Ritter. OBHX:  
OB History   
4 Para 2 Term 2  AB 1 Living  
2 SAB  
1  
 TAB Ectopic Molar Multiple  
0 Live Births 2 PMH:  
Past Medical History:  
Diagnosis Date  Anemia since birth  Gestational hypertension  Headache   
 migraines  Hypertension since birth PSH:  
Past Surgical History:  
Procedure Laterality Date [facility] 812 ONLY  2016  HX  SECTION    
 
 
OB/GYN: C/S x 2 , SAB x 1 Meds:  
Prior to Admission Medications Prescriptions Last Dose Informant Patient Reported? Taking? IRON, FERROUS SULFATE, PO Not Taking at Unknown time  Yes No  
Sig: Take 1 Tab by mouth daily. albuterol (PROVENTIL HFA, VENTOLIN HFA, PROAIR HFA) 90 mcg/actuation inhaler Not Taking at Unknown time  No No  
Sig: Take 1 Puff by inhalation every six (6) hours as needed for Wheezing. prenatal vit-iron fumarate-fa 27 mg iron- 0.8 mg tab tablet 2019 at Unknown time  No Yes Sig: Take 1 Tab by mouth daily. Facility-Administered Medications: None Allergies: Allergies Allergen Reactions  Tomato Anaphylaxis  Citrus Flavor Hives  Ibuprofen Other (comments) Pt states med made her chest hurt.  Seafood [Shellfish Containing Products] Hives Pertinent ROS: Review of Systems - as per HPI 
 
[facility]:  
Family History Problem Relation Age of Onset  Hypertension Mother  Diabetes Mother  Hypertension Sister  Hypertension Maternal Grandmother  No Known Problems Daughter SH: Social History Socioeconomic History  Marital status:  Spouse name: Not on file  Number of children: 0  
 Years of education: Not on file  Highest education level: Not on file Occupational History Comment: works at [employer] Social Needs  Financial resource strain: Not on file  Food insecurity:  
  Worry: Not on file Inability: Not on file  Transportation needs:  
  Medical: Not on file Non-medical: Not on file Tobacco Use  Smoking status: Never Smoker  Smokeless tobacco: Never Used Substance and Sexual Activity  Alcohol use: No  
 Drug use: No  
  Comment: denies  Sexual activity: Not Currently Partners: Male Birth control/protection: None Comment: vijaya Holm Lifestyle  Physical activity:  
  Days per week: Not on file Minutes per session: Not on file  Stress: Not on file Relationships  Social connections:  
  Talks on phone: Not on file Gets together: Not on file Attends Zoroastrianism service: Not on file Active member of club or organization: Not on file Attends meetings of clubs or organizations: Not on file Relationship status: Not on file  Intimate partner violence:  
  Fear of current or ex partner: Not on file Emotionally abused: Not on file Physically abused: Not on file Forced sexual activity: Not on file Other Topics Concern  Not on file Social History Narrative  Not on file OBJECTIVE: 
 
Temp (24hrs), Av.5 °F (36.9 °C), Min:98.5 °F (36.9 °C), Max:98.5 °F (36.9 °C) Visit Vitals /62 Pulse 89 Temp 98.5 °F (36.9 °C) Resp 16 Ht 5' 4\" (1.626 m) Wt 115.7 kg (255 lb) LMP 2018 (Within Days) BMI 43.77 kg/m² FHR baseline 140, moderate variability, no decels, + accels [place] no contractions Exam: 
General: alert HEENT:  normal  
Lungs:  clear Cor:  RRR Abdomen:  Soft, non-tender Cervix:  Closed thick high OB sono- wnl, c/w dates, Cervical length 4. 3(verbal) Recent Results (from the past 12 hour(s)) URINALYSIS W/ REFLEX CULTURE Collection Time: 05/06/19  7:17 PM  
Result Value Ref Range Color YELLOW/STRAW Appearance CLOUDY (A) CLEAR Specific gravity 1.021 1.003 - 1.030    
 pH (UA) 7.5 5.0 - 8.0 Protein NEGATIVE  NEG mg/dL Glucose NEGATIVE  NEG mg/dL Ketone NEGATIVE  NEG mg/dL Bilirubin NEGATIVE  NEG Blood NEGATIVE  NEG Urobilinogen 1.0 0.2 - 1.0 EU/dL Nitrites NEGATIVE  NEG Leukocyte Esterase TRACE (A) NEG    
 WBC 0-4 0 - 4 /hpf  
 RBC 0-5 0 - 5 /hpf Epithelial cells MANY (A) FEW /lpf Bacteria 1+ (A) NEG /hpf  
 UA:UC IF INDICATED URINE CULTURE ORDERED (A) CNI Impression: [id number] at 23w3d with threatened PTL and no prenatal care Plan: No evidence of PTL, Pt cervix closed and with normal CL. Pt encourage to increase po fluids. Pt encouraged to seek prenatal care as soon as possible. PTL precautions reviewed. All questions answered Ananya Beaver MD 
8:53 PM

## 2019-05-07 NOTE — PROGRESS NOTES
1935: Bedside and Verbal shift change report given to JUSTINE Lieberman RN (oncoming nurse) by Tanisha Figueroa RN (offgoing nurse). Report included the following information SBAR.  
 
1945: ultrasound tech at bedside to perform transvaginal us 2012: ultrasound has been completed, pt denies any cramping or abdominal pain 2040: MD at bedside 2045: pelvic exam by Dr. Carmen Seo, cervix is closed and long

## 2019-05-08 LAB
BACTERIA SPEC CULT: ABNORMAL
BACTERIA SPEC CULT: ABNORMAL
CC UR VC: ABNORMAL
SERVICE CMNT-IMP: ABNORMAL

## 2019-06-13 LAB
ANTIBODY SCREEN, EXTERNAL: NEGATIVE
CHLAMYDIA, EXTERNAL: NEGATIVE
HBSAG, EXTERNAL: NEGATIVE
HIV, EXTERNAL: NEGATIVE
N. GONORRHEA, EXTERNAL: NEGATIVE
RUBELLA, EXTERNAL: NORMAL
T. PALLIDUM, EXTERNAL: NEGATIVE

## 2019-06-20 ENCOUNTER — HOSPITAL ENCOUNTER (OUTPATIENT)
Dept: PERINATAL CARE | Age: 29
Discharge: HOME | End: 2019-06-20
Attending: OBSTETRICS & GYNECOLOGY
Payer: MEDICAID

## 2019-06-20 PROCEDURE — 76811 OB US DETAILED SNGL FETUS: CPT | Performed by: OBSTETRICS & GYNECOLOGY

## 2019-08-09 LAB — GRBS, EXTERNAL: POSITIVE

## 2019-08-23 ENCOUNTER — HOSPITAL ENCOUNTER (OUTPATIENT)
Dept: OTHER | Age: 29
Discharge: HOME | DRG: 540 | End: 2019-08-23
Payer: MEDICAID

## 2019-08-23 LAB
ERYTHROCYTE [DISTWIDTH] IN BLOOD BY AUTOMATED COUNT: 14.3 % (ref 11.5–14.5)
HCT VFR BLD AUTO: 29.5 % (ref 35–47)
HGB BLD-MCNC: 9.1 G/DL (ref 11.5–16)
MCH RBC QN AUTO: 25.4 PG (ref 26–34)
MCHC RBC AUTO-ENTMCNC: 30.8 G/DL (ref 30–36.5)
MCV RBC AUTO: 82.4 FL (ref 80–99)
NRBC # BLD: 0 K/UL (ref 0–0.01)
NRBC BLD-RTO: 0 PER 100 WBC
PLATELET # BLD AUTO: 332 K/UL (ref 150–400)
PMV BLD AUTO: 10.9 FL (ref 8.9–12.9)
RBC # BLD AUTO: 3.58 M/UL (ref 3.8–5.2)
WBC # BLD AUTO: 6.8 K/UL (ref 3.6–11)

## 2019-08-23 PROCEDURE — 85027 COMPLETE CBC AUTOMATED: CPT

## 2019-08-23 PROCEDURE — 86900 BLOOD TYPING SEROLOGIC ABO: CPT

## 2019-08-23 PROCEDURE — 36415 COLL VENOUS BLD VENIPUNCTURE: CPT

## 2019-08-23 NOTE — PROGRESS NOTES
[id number] Patient here for Pre-Admission Testing (PAT) for scheduled  section. PAT packet reviewed with the patient. Labs drawn and sent. KENNETH wipes and preventing surgical site infection education given to the patient. Education also provided to be NPO after midnight  and to arrive for scheduled procedure at 0800 on 19. Patient verbalizes understanding and sent home with PAT packet for further review. Patient instructed to take no   medication(s) on the morning of her scheduled procedure.

## 2019-08-26 ENCOUNTER — ANESTHESIA (OUTPATIENT)
Dept: LABOR AND DELIVERY | Age: 29
DRG: 540 | End: 2019-08-26
Payer: MEDICAID

## 2019-08-26 ENCOUNTER — ANESTHESIA EVENT (OUTPATIENT)
Dept: LABOR AND DELIVERY | Age: 29
DRG: 540 | End: 2019-08-26
Payer: MEDICAID

## 2019-08-26 ENCOUNTER — HOSPITAL ENCOUNTER (INPATIENT)
Age: 29
LOS: 2 days | Discharge: HOME | DRG: 540 | End: 2019-08-28
Attending: OBSTETRICS & GYNECOLOGY | Admitting: OBSTETRICS & GYNECOLOGY
Payer: MEDICAID

## 2019-08-26 DIAGNOSIS — R52 PAIN: Primary | ICD-10-CM

## 2019-08-26 LAB
ABO + RH BLD: NORMAL
BLOOD GROUP ANTIBODIES SERPL: NORMAL
SPECIMEN EXP DATE BLD: NORMAL

## 2019-08-26 PROCEDURE — 74011000250 HC RX REV CODE- 250: Performed by: ANESTHESIOLOGY

## 2019-08-26 PROCEDURE — 77030007866 HC KT SPN ANES BBMI -B: Performed by: ANESTHESIOLOGY

## 2019-08-26 PROCEDURE — 74011250636 HC RX REV CODE- 250/636: Performed by: ANESTHESIOLOGY

## 2019-08-26 PROCEDURE — 74011250636 HC RX REV CODE- 250/636: Performed by: NURSE ANESTHETIST, CERTIFIED REGISTERED

## 2019-08-26 PROCEDURE — 76010000392 HC C SECN EA ADDL 0.5 HR: Performed by: OBSTETRICS & GYNECOLOGY

## 2019-08-26 PROCEDURE — 74011250636 HC RX REV CODE- 250/636: Performed by: OBSTETRICS & GYNECOLOGY

## 2019-08-26 PROCEDURE — 77030012890

## 2019-08-26 PROCEDURE — 74011250636 HC RX REV CODE- 250/636

## 2019-08-26 PROCEDURE — 76010000391 HC C SECN FIRST 1 HR: Performed by: OBSTETRICS & GYNECOLOGY

## 2019-08-26 PROCEDURE — 77030034849

## 2019-08-26 PROCEDURE — 65410000002 HC RM PRIVATE OB

## 2019-08-26 PROCEDURE — 77030032060 HC PWDR HEMSTAT ARISTA ASRB 3GM BARD -C

## 2019-08-26 PROCEDURE — 74011000250 HC RX REV CODE- 250: Performed by: NURSE ANESTHETIST, CERTIFIED REGISTERED

## 2019-08-26 PROCEDURE — 75410000003 HC RECOV DEL/VAG/CSECN EA 0.5 HR: Performed by: OBSTETRICS & GYNECOLOGY

## 2019-08-26 PROCEDURE — 76060000078 HC EPIDURAL ANESTHESIA: Performed by: OBSTETRICS & GYNECOLOGY

## 2019-08-26 PROCEDURE — 77030040361 HC SLV COMPR DVT MDII -B

## 2019-08-26 RX ORDER — MORPHINE SULFATE 1 MG/ML
INJECTION, SOLUTION EPIDURAL; INTRATHECAL; INTRAVENOUS
Status: COMPLETED | OUTPATIENT
Start: 2019-08-26 | End: 2019-08-26

## 2019-08-26 RX ORDER — OXYTOCIN/RINGER'S LACTATE 20/1000 ML
PLASTIC BAG, INJECTION (ML) INTRAVENOUS AS NEEDED
Status: DISCONTINUED | OUTPATIENT
Start: 2019-08-26 | End: 2019-08-26 | Stop reason: HOSPADM

## 2019-08-26 RX ORDER — SODIUM CHLORIDE, SODIUM LACTATE, POTASSIUM CHLORIDE, CALCIUM CHLORIDE 600; 310; 30; 20 MG/100ML; MG/100ML; MG/100ML; MG/100ML
1000 INJECTION, SOLUTION INTRAVENOUS CONTINUOUS
Status: DISCONTINUED | OUTPATIENT
Start: 2019-08-26 | End: 2019-08-26 | Stop reason: HOSPADM

## 2019-08-26 RX ORDER — SIMETHICONE 80 MG
80 TABLET,CHEWABLE ORAL
Status: DISCONTINUED | OUTPATIENT
Start: 2019-08-26 | End: 2019-08-28 | Stop reason: HOSPADM

## 2019-08-26 RX ORDER — AMMONIA 15 % (W/V)
1 AMPUL (EA) INHALATION AS NEEDED
Status: DISCONTINUED | OUTPATIENT
Start: 2019-08-26 | End: 2019-08-28 | Stop reason: HOSPADM

## 2019-08-26 RX ORDER — DOCUSATE SODIUM 100 MG/1
100 CAPSULE, LIQUID FILLED ORAL
Status: DISCONTINUED | OUTPATIENT
Start: 2019-08-26 | End: 2019-08-28 | Stop reason: HOSPADM

## 2019-08-26 RX ORDER — SODIUM CHLORIDE, SODIUM LACTATE, POTASSIUM CHLORIDE, CALCIUM CHLORIDE 600; 310; 30; 20 MG/100ML; MG/100ML; MG/100ML; MG/100ML
125 INJECTION, SOLUTION INTRAVENOUS CONTINUOUS
Status: DISCONTINUED | OUTPATIENT
Start: 2019-08-26 | End: 2019-08-28 | Stop reason: HOSPADM

## 2019-08-26 RX ORDER — KETOROLAC TROMETHAMINE 30 MG/ML
30 INJECTION, SOLUTION INTRAMUSCULAR; INTRAVENOUS
Status: DISCONTINUED | OUTPATIENT
Start: 2019-08-26 | End: 2019-08-26

## 2019-08-26 RX ORDER — NALBUPHINE HYDROCHLORIDE 10 MG/ML
2.5 INJECTION, SOLUTION INTRAMUSCULAR; INTRAVENOUS; SUBCUTANEOUS
Status: ACTIVE | OUTPATIENT
Start: 2019-08-26 | End: 2019-08-27

## 2019-08-26 RX ORDER — MORPHINE SULFATE 10 MG/ML
10 INJECTION, SOLUTION INTRAMUSCULAR; INTRAVENOUS
Status: DISPENSED | OUTPATIENT
Start: 2019-08-26 | End: 2019-08-27

## 2019-08-26 RX ORDER — OXYTOCIN/RINGER'S LACTATE 20/1000 ML
125 PLASTIC BAG, INJECTION (ML) INTRAVENOUS AS NEEDED
Status: DISCONTINUED | OUTPATIENT
Start: 2019-08-26 | End: 2019-08-28 | Stop reason: HOSPADM

## 2019-08-26 RX ORDER — SODIUM CHLORIDE 0.9 % (FLUSH) 0.9 %
5-40 SYRINGE (ML) INJECTION AS NEEDED
Status: DISCONTINUED | OUTPATIENT
Start: 2019-08-26 | End: 2019-08-26 | Stop reason: HOSPADM

## 2019-08-26 RX ORDER — NALBUPHINE HYDROCHLORIDE 10 MG/ML
2.5 INJECTION, SOLUTION INTRAMUSCULAR; INTRAVENOUS; SUBCUTANEOUS ONCE
Status: COMPLETED | OUTPATIENT
Start: 2019-08-26 | End: 2019-08-26

## 2019-08-26 RX ORDER — CEFAZOLIN SODIUM/WATER 2 G/20 ML
2 SYRINGE (ML) INTRAVENOUS ONCE
Status: COMPLETED | OUTPATIENT
Start: 2019-08-26 | End: 2019-08-26

## 2019-08-26 RX ORDER — ZOLPIDEM TARTRATE 5 MG/1
5 TABLET ORAL
Status: DISCONTINUED | OUTPATIENT
Start: 2019-08-26 | End: 2019-08-28 | Stop reason: HOSPADM

## 2019-08-26 RX ORDER — SODIUM CHLORIDE 9 MG/ML
250 INJECTION, SOLUTION INTRAVENOUS AS NEEDED
Status: DISCONTINUED | OUTPATIENT
Start: 2019-08-26 | End: 2019-08-26

## 2019-08-26 RX ORDER — ONDANSETRON 2 MG/ML
INJECTION INTRAMUSCULAR; INTRAVENOUS AS NEEDED
Status: DISCONTINUED | OUTPATIENT
Start: 2019-08-26 | End: 2019-08-26 | Stop reason: HOSPADM

## 2019-08-26 RX ORDER — NALOXONE HYDROCHLORIDE 0.4 MG/ML
0.4 INJECTION, SOLUTION INTRAMUSCULAR; INTRAVENOUS; SUBCUTANEOUS AS NEEDED
Status: DISCONTINUED | OUTPATIENT
Start: 2019-08-26 | End: 2019-08-28 | Stop reason: HOSPADM

## 2019-08-26 RX ORDER — HYDROCORTISONE ACETATE PRAMOXINE HCL 2.5; 1 G/100G; G/100G
4 CREAM TOPICAL AS NEEDED
Status: DISCONTINUED | OUTPATIENT
Start: 2019-08-26 | End: 2019-08-28 | Stop reason: HOSPADM

## 2019-08-26 RX ORDER — ONDANSETRON 2 MG/ML
4 INJECTION INTRAMUSCULAR; INTRAVENOUS
Status: DISCONTINUED | OUTPATIENT
Start: 2019-08-26 | End: 2019-08-26 | Stop reason: SDUPTHER

## 2019-08-26 RX ORDER — BUPIVACAINE HYDROCHLORIDE 7.5 MG/ML
INJECTION, SOLUTION INTRASPINAL
Status: COMPLETED | OUTPATIENT
Start: 2019-08-26 | End: 2019-08-26

## 2019-08-26 RX ORDER — NALBUPHINE HYDROCHLORIDE 10 MG/ML
INJECTION, SOLUTION INTRAMUSCULAR; INTRAVENOUS; SUBCUTANEOUS
Status: COMPLETED
Start: 2019-08-26 | End: 2019-08-26

## 2019-08-26 RX ORDER — ONDANSETRON 2 MG/ML
4 INJECTION INTRAMUSCULAR; INTRAVENOUS
Status: ACTIVE | OUTPATIENT
Start: 2019-08-26 | End: 2019-08-27

## 2019-08-26 RX ORDER — SODIUM CHLORIDE 0.9 % (FLUSH) 0.9 %
5-40 SYRINGE (ML) INJECTION EVERY 8 HOURS
Status: DISCONTINUED | OUTPATIENT
Start: 2019-08-26 | End: 2019-08-28 | Stop reason: HOSPADM

## 2019-08-26 RX ORDER — ACETAMINOPHEN 10 MG/ML
INJECTION, SOLUTION INTRAVENOUS AS NEEDED
Status: DISCONTINUED | OUTPATIENT
Start: 2019-08-26 | End: 2019-08-26 | Stop reason: HOSPADM

## 2019-08-26 RX ORDER — NALBUPHINE HYDROCHLORIDE 10 MG/ML
2.5 INJECTION, SOLUTION INTRAMUSCULAR; INTRAVENOUS; SUBCUTANEOUS
Status: DISCONTINUED | OUTPATIENT
Start: 2019-08-26 | End: 2019-08-26

## 2019-08-26 RX ORDER — ACETAMINOPHEN 325 MG/1
650 TABLET ORAL
Status: DISCONTINUED | OUTPATIENT
Start: 2019-08-26 | End: 2019-08-28 | Stop reason: HOSPADM

## 2019-08-26 RX ORDER — OXYTOCIN/RINGER'S LACTATE 20/1000 ML
999 PLASTIC BAG, INJECTION (ML) INTRAVENOUS AS NEEDED
Status: DISCONTINUED | OUTPATIENT
Start: 2019-08-26 | End: 2019-08-28 | Stop reason: HOSPADM

## 2019-08-26 RX ORDER — ONDANSETRON 2 MG/ML
4 INJECTION INTRAMUSCULAR; INTRAVENOUS
Status: DISCONTINUED | OUTPATIENT
Start: 2019-08-26 | End: 2019-08-28 | Stop reason: HOSPADM

## 2019-08-26 RX ORDER — GLYCOPYRROLATE 0.2 MG/ML
INJECTION INTRAMUSCULAR; INTRAVENOUS AS NEEDED
Status: DISCONTINUED | OUTPATIENT
Start: 2019-08-26 | End: 2019-08-26 | Stop reason: HOSPADM

## 2019-08-26 RX ORDER — MORPHINE SULFATE 10 MG/ML
6 INJECTION, SOLUTION INTRAMUSCULAR; INTRAVENOUS
Status: DISPENSED | OUTPATIENT
Start: 2019-08-26 | End: 2019-08-27

## 2019-08-26 RX ORDER — HYDROCORTISONE 1 %
CREAM (GRAM) TOPICAL AS NEEDED
Status: DISCONTINUED | OUTPATIENT
Start: 2019-08-26 | End: 2019-08-28 | Stop reason: HOSPADM

## 2019-08-26 RX ORDER — EPHEDRINE SULFATE/0.9% NACL/PF 50 MG/5 ML
SYRINGE (ML) INTRAVENOUS AS NEEDED
Status: DISCONTINUED | OUTPATIENT
Start: 2019-08-26 | End: 2019-08-26 | Stop reason: HOSPADM

## 2019-08-26 RX ORDER — SODIUM CHLORIDE 0.9 % (FLUSH) 0.9 %
5-40 SYRINGE (ML) INJECTION AS NEEDED
Status: DISCONTINUED | OUTPATIENT
Start: 2019-08-26 | End: 2019-08-28 | Stop reason: HOSPADM

## 2019-08-26 RX ORDER — MORPHINE SULFATE 10 MG/ML
6 INJECTION, SOLUTION INTRAMUSCULAR; INTRAVENOUS
Status: DISCONTINUED | OUTPATIENT
Start: 2019-08-26 | End: 2019-08-26

## 2019-08-26 RX ORDER — SODIUM CHLORIDE 0.9 % (FLUSH) 0.9 %
5-40 SYRINGE (ML) INJECTION EVERY 8 HOURS
Status: DISCONTINUED | OUTPATIENT
Start: 2019-08-26 | End: 2019-08-26 | Stop reason: HOSPADM

## 2019-08-26 RX ORDER — MORPHINE SULFATE 10 MG/ML
10 INJECTION, SOLUTION INTRAMUSCULAR; INTRAVENOUS
Status: DISCONTINUED | OUTPATIENT
Start: 2019-08-26 | End: 2019-08-26

## 2019-08-26 RX ORDER — NALOXONE HYDROCHLORIDE 0.4 MG/ML
0.4 INJECTION, SOLUTION INTRAMUSCULAR; INTRAVENOUS; SUBCUTANEOUS AS NEEDED
Status: DISCONTINUED | OUTPATIENT
Start: 2019-08-26 | End: 2019-08-26

## 2019-08-26 RX ORDER — OXYCODONE HYDROCHLORIDE 5 MG/1
5 TABLET ORAL
Status: DISCONTINUED | OUTPATIENT
Start: 2019-08-26 | End: 2019-08-28 | Stop reason: HOSPADM

## 2019-08-26 RX ADMIN — SODIUM CHLORIDE, SODIUM LACTATE, POTASSIUM CHLORIDE, AND CALCIUM CHLORIDE 1000 ML: 600; 310; 30; 20 INJECTION, SOLUTION INTRAVENOUS at 09:25

## 2019-08-26 RX ADMIN — Medication 5 MG: at 10:27

## 2019-08-26 RX ADMIN — Medication 1 ML: at 10:52

## 2019-08-26 RX ADMIN — NALBUPHINE HYDROCHLORIDE 2.5 MG: 10 INJECTION, SOLUTION INTRAMUSCULAR; INTRAVENOUS; SUBCUTANEOUS at 13:34

## 2019-08-26 RX ADMIN — BUPIVACAINE HYDROCHLORIDE IN DEXTROSE 10.5 MG: 7.5 INJECTION, SOLUTION SUBARACHNOID at 10:20

## 2019-08-26 RX ADMIN — MORPHINE SULFATE 0.2 MG: 1 INJECTION, SOLUTION EPIDURAL; INTRATHECAL; INTRAVENOUS at 10:20

## 2019-08-26 RX ADMIN — MORPHINE SULFATE 6 MG: 10 INJECTION INTRAVENOUS at 22:40

## 2019-08-26 RX ADMIN — GLYCOPYRROLATE 0.2 MG: 0.2 INJECTION, SOLUTION INTRAMUSCULAR; INTRAVENOUS at 10:28

## 2019-08-26 RX ADMIN — ONDANSETRON HYDROCHLORIDE 4 MG: 2 INJECTION, SOLUTION INTRAMUSCULAR; INTRAVENOUS at 10:25

## 2019-08-26 RX ADMIN — PHENYLEPHRINE HYDROCHLORIDE 80 MCG: 10 INJECTION INTRAVENOUS at 10:25

## 2019-08-26 RX ADMIN — MORPHINE SULFATE 6 MG: 10 INJECTION INTRAVENOUS at 14:08

## 2019-08-26 RX ADMIN — PHENYLEPHRINE HYDROCHLORIDE 20 MCG/MIN: 10 INJECTION INTRAVENOUS at 10:20

## 2019-08-26 RX ADMIN — Medication 200 ML: at 11:04

## 2019-08-26 RX ADMIN — Medication 5 MG: at 10:25

## 2019-08-26 RX ADMIN — ACETAMINOPHEN 1000 MG: 10 INJECTION, SOLUTION INTRAVENOUS at 11:02

## 2019-08-26 RX ADMIN — SODIUM CHLORIDE, SODIUM LACTATE, POTASSIUM CHLORIDE, AND CALCIUM CHLORIDE 1000 ML: 600; 310; 30; 20 INJECTION, SOLUTION INTRAVENOUS at 08:20

## 2019-08-26 RX ADMIN — Medication 2 G: at 10:02

## 2019-08-26 RX ADMIN — Medication 100 ML: at 11:28

## 2019-08-26 RX ADMIN — SODIUM CHLORIDE, SODIUM LACTATE, POTASSIUM CHLORIDE, AND CALCIUM CHLORIDE 125 ML/HR: 600; 310; 30; 20 INJECTION, SOLUTION INTRAVENOUS at 14:09

## 2019-08-26 RX ADMIN — SODIUM CHLORIDE, SODIUM LACTATE, POTASSIUM CHLORIDE, AND CALCIUM CHLORIDE: 600; 310; 30; 20 INJECTION, SOLUTION INTRAVENOUS at 09:55

## 2019-08-26 RX ADMIN — Medication 199 ML: at 10:57

## 2019-08-26 RX ADMIN — Medication 5 MG: at 10:44

## 2019-08-26 RX ADMIN — Medication 200 ML: at 11:12

## 2019-08-26 NOTE — PROCEDURES
Operative Note    Name: Rufina Horton   Medical Record Number: [id number]   YOB: 1990  Today's Date: 2019      Pre-operative Diagnosis: REPEAT     Post-operative Diagnosis: same, filmy adhesions of the bowel to the uterus    Operation: Low Transverse Procedure(s):   SECTION, lysis of filmy adhesions    Surgeon(s): MD Marni Wood MD    Anesthesia: Spinal    Prophylactic Antibiotics: Ancef  DVT Prophylaxis: Sequential Compression Devices         Fetal Description: sweeney     Birth Information:   Information for the patient's :  Bren Fuentes [[id number]]   Delivery of a   male infant on 2019 at 10:50 AM. Apgars were 9  and 9 . Umbilical Cord: 3 Vessels     Umbilical Cord Events: None     Placenta: Expressed removal with Normal;Intact appearance. Amniotic Fluid Volume: Moderate     Amniotic Fluid Description:  Clear        Umbilical Cord: 3 vessel cord    Placenta:  spontaneous    Specimens: cord blood, placenta examined and discarded           Complications:  none    Procedure Detail:      After proper patient identification and consent, the patient was taken to the operating room, where spinal anesthesia was administered and found to be adequate. Brooks catheter had been placed using sterile technique. The patient was prepped and draped in the normal sterile fashion. The abdomen was entered using the Pfannenstiel technique. The peritoneum was entered sharply well superior to the bladder without any apparent injury. A low transverse uterine incision was made with the scalpel and extended with blunt finger dissection. Amniotomy was performed and the fluid was medium amount clear. The babys head was then delivered atraumatically. The nose and mouth were suctioned. The cord was clamped and cut and the baby was handed off to Nursing staff in attendance. Placenta was then removed from the uterus.  The uterus was curettaged with a moist lap pad and cleared of all clots and debris. The uterine incision was closed with 0 monocryl, double layer  in running locking fashion with good hemostasis assured. The posterior cul-de-sac was cleaned and filmy adhesions to the bowel were noted. They were taken down with the bovie. Good hemostasis was again reassured and the uterus was returned to the abdomen. The anterior pelvis was cleaned and good hemostasis was again reassured throughout. The hysterotomy site was noted to be hemostatic. Elvia was placed over it. The fascal layers were noted to be scarred and were closed with 0 PDS in a running fashion. Good hemostasis was assured. The subcutaneous tissue was closed with 2-0 vicryl suture. The skin was closed with a 4-0 moncryl closure. The patient tolerated the procedure well. Sponge, lap, and needle counts were correct times three and the patient and baby were taken to recovery/postpartum room in stable condition. Elif Stafford  [facility] Familia Bowers MD  August 26, 2019  11:40 AM

## 2019-08-26 NOTE — ANESTHESIA PROCEDURE NOTES
Spinal Block    Start time: 8/26/2019 10:11 AM  End time: 8/26/2019 11:21 AM  Performed by: Valarie Pallas, MD  Authorized by: Valarie Pallas, MD     Pre-procedure:   Indications: primary anesthetic  Preanesthetic Checklist: risks and benefits discussed and timeout performed    Timeout Time: 10:12          Spinal Block:   Patient Position:  Seated  Prep Region:  Lumbar  Prep: Betadine      Location:  L3-4  Technique:  Single shot        Needle:   Needle Type:  Pencil-tip  Needle Gauge:  25 G  Attempts:  1      Events: CSF confirmed, no blood with aspiration and no paresthesia        Assessment:  Insertion:  Uncomplicated  Patient tolerance:  Patient tolerated the procedure well with no immediate complications

## 2019-08-26 NOTE — ROUTINE PROCESS
1425: TRANSFER - IN REPORT:    Telephone report received from [address] [place] [address] Rd (name) on Ori Kitchen  being received from L&D (unit) for routine progression of care      Report consisted of patients Situation, Background, Assessment and   Recommendations(SBAR). Information from the following report(s) SBAR was reviewed with the receiving nurse. Opportunity for questions and clarification was provided. Assessment completed upon patients arrival to unit and care assumed.

## 2019-08-26 NOTE — ANESTHESIA PREPROCEDURE EVALUATION
Relevant Problems   No relevant active problems       Anesthetic History   No history of anesthetic complications            Review of Systems / Medical History  Patient summary reviewed, nursing notes reviewed and pertinent labs reviewed    Pulmonary  Within defined limits                 Neuro/Psych   Within defined limits           Cardiovascular  Within defined limits  Hypertension: well controlled              Exercise tolerance: >4 METS     GI/Hepatic/Renal  Within defined limits              Endo/Other        Obesity and anemia     Other Findings   Comments: TIUP,            Physical Exam    Airway  Mallampati: II  TM Distance: > 6 cm  Neck ROM: normal range of motion   Mouth opening: Normal     Cardiovascular  Regular rate and rhythm,  S1 and S2 normal,  no murmur, click, rub, or gallop             Dental  No notable dental hx       Pulmonary  Breath sounds clear to auscultation               Abdominal  GI exam deferred       Other Findings            Anesthetic Plan    ASA: 3  Anesthesia type: spinal      Post-op pain plan if not by surgeon: intrathecal opiates    Induction: Intravenous  Anesthetic plan and risks discussed with: Patient and Spouse

## 2019-08-26 NOTE — ANESTHESIA POSTPROCEDURE EVALUATION
Post-Anesthesia Evaluation and Assessment    Patient: Cydney Vega MRN: [id number]  SSN: [id number]    YOB: 1990  Age: 29 y.o. Sex: female      I have evaluated the patient and they are stable and ready for discharge from the PACU. Cardiovascular Function/Vital Signs  Visit Vitals  /56 (BP 1 Location: Right arm, BP Patient Position: At rest)   Pulse (!) 53   Temp 36.6 °C (97.8 °F)   Resp 18   Ht 5' 4\" (1.626 m)   Wt 118.4 kg (261 lb)   SpO2 100%   Breastfeeding? Unknown   BMI 44.80 kg/m²       Patient is status post Spinal anesthesia for Procedure(s) with comments:   SECTION - EDC 19. Nausea/Vomiting: None    Postoperative hydration reviewed and adequate. Pain:  Pain Scale 1: Numeric (0 - 10) (19 1155)  Pain Intensity 1: 0 (19 1155)   Managed    Neurological Status:   Neuro (WDL): Within Defined Limits(pt states mild blurred vision) (19 1155)   At baseline    Mental Status, Level of Consciousness: Alert and  oriented to person, place, and time    Pulmonary Status:   O2 Device: Room air (19 1155)   Adequate oxygenation and airway patent    Complications related to anesthesia: None    Post-anesthesia assessment completed. No concerns    Signed By: Fernando Bojorquez MD     2019              Procedure(s):   SECTION. spinal    <BSHSIANPOST>    Vitals Value Taken Time   /56 2019 12:32 PM   Temp 36.6 °C (97.8 °F) 2019 11:55 AM   Pulse 53 2019 12:32 PM   Resp 18 2019 12:32 PM   SpO2 98 % 2019 12:39 PM   Vitals shown include unvalidated device data.

## 2019-08-26 NOTE — H&P
History & Physical    Name: Johnney Lennox MRN: [id number]  SSN: [id number]    YOB: 1990  Age: 29 y.o. Sex: female      Subjective: scheduled C/S     Estimated Date of Delivery: 19  OB History        4    Para   2    Term   2            AB   1    Living   2       SAB   1    TAB        Ectopic        Molar        Multiple   0    Live Births   2                Ms. Heather Mahan admitted with pregnancy at 39w3d for  section due to previous  section. Prenatal course was complicated by chronic hypertension and obesity. Please see prenatal records for details. Past Medical History:   Diagnosis Date    Anemia since birth   Neff Gestational hypertension     Headache     migraines    Hypertension since birth     Past Surgical History:   Procedure Laterality Date     DELIVERY ONLY  2016    HX  SECTION       Social History     Occupational History     Comment: works at [employer]   Tobacco Use    Smoking status: Never Smoker    Smokeless tobacco: Never Used   Substance and Sexual Activity    Alcohol use: No    Drug use: No     Comment: denies     Sexual activity: Not Currently     Partners: Male     Birth control/protection: None     Comment: vijaya Holm     Family History   Problem Relation Age of Onset    Hypertension Mother     Diabetes Mother     Hypertension Sister     Hypertension Maternal Grandmother     No Known Problems Daughter        Allergies   Allergen Reactions    Tomato Anaphylaxis    Cotton Flavor Hives    Ibuprofen Other (comments)     Pt states med made her chest hurt.  Seafood [Shellfish Containing Products] Hives     Prior to Admission medications    Medication Sig Start Date End Date Taking? Authorizing Provider   prenatal vit-iron fumarate-fa 27 mg iron- 0.8 mg tab tablet Take 1 Tab by mouth daily. 8/3/17  Yes Chacha Anthony MD   IRON, FERROUS SULFATE, PO Take 1 Tab by mouth daily. Provider, Historical   albuterol (PROVENTIL HFA, VENTOLIN HFA, PROAIR HFA) 90 mcg/actuation inhaler Take 1 Puff by inhalation every six (6) hours as needed for Wheezing. 3/31/17   Benji Lima MD        Review of Systems: A comprehensive review of systems was negative except for that written in the History of Present Illness. Objective:     Vitals:  Vitals:    19 1015   Weight: 118.4 kg (261 lb)   Height: 5' 4\" (1.626 m)        Physical Exam:  Patient without distress. Heart: Regular rate and rhythm  Lung: clear to auscultation throughout lung fields, no wheezes, no rales, no rhonchi and normal respiratory effort  Abdomen: soft, nontender  Fundus: soft and non tender  Lower Extremities: no calf tenderness  Membranes:  Intact  Fetal Heart Rate: Reactive  Baseline: 135 per minute  Variability: moderate  Accelerations: yes  Decelerations: none  Uterine contractions: irregular    Prenatal Labs:   Lab Results   Component Value Date/Time    Rubella, External immune 2019    GrBStrep, External Positive 2019    HBsAg, External Negative 2019    HIV, External Negative 2019    RPR, External Non Reactive 01/10/2018    Gonorrhea, External Negative 2019    Chlamydia, External Negative 2019        Impression/Plan:     Plan:  Admit for  section. Group B Strep was positive, use of prophylactic antibiotics not indicated. Discussed the risks of surgery including the risks of bleeding, infection, deep vein thrombosis, and surgical injuries to internal organs including but not limited to the bowels, bladder, rectum, and female reproductive organs. The patient understands the risks; any and all questions were answered to the patient's satisfaction. Signed By:  Nohemi Redding MD     2019

## 2019-08-26 NOTE — PROGRESS NOTES
A  admitted to [facility] 3 under the services of Dr. Mina Edwards, Dr. Jesika Irby covering. Pt admitted for repeat c/section. Pt denies complications with this pregnancy. 4630:  Dr. Jesika Irby at bedside discussing plan of care with pt.  4165:  SHERI hose applied. \  6797:  Monitors off for abdominal prep. 0840:  Abdomen clipped and cleansed with CHG wipes. 1426: Baby very active, difficulty tracing continuously. 9031:  Dr. Matthew Aldana at bedside discussing plan of care with pt.  0945:  Monitors off.  1001:  Transferred to OR 2 ambulatory accompanied by EZEKIEL Nguyen RN. 1150:  Transfered to LR 3 via Labor bed, accompanied by DEV Santos,  Med Salas RN and CRNA. Pt holding infant on transfer. 1240:  Report to TOOTIE Zhou RN for lunch coverage. 1315:  Care resumed per Med Salas RN.  3339:  TRANSFER - OUT REPORT:    Verbal report given to DEV Amos RN(name) [facility]  [facility] to [place] [address](unit) for routine progression of care       Report consisted of patients Situation, Background, Assessment and   Recommendations(SBAR). Information from the following report(s) SBAR, Kardex, Intake/Output, MAR and Recent Results was reviewed with the receiving nurse. Lines:   Peripheral IV 19 Left Hand (Active)   Site Assessment Clean, dry, & intact 2019  9:09 AM   Phlebitis Assessment 0 2019  9:09 AM   Infiltration Assessment 0 2019  9:09 AM   Dressing Status Clean, dry, & intact 2019  9:09 AM   Dressing Type Transparent 2019  9:09 AM   Hub Color/Line Status Pink; Infusing 2019  9:09 AM       Peripheral IV 19 Right Hand (Active)        Opportunity for questions and clarification was provided.       Patient transported with:   Registered Nurse

## 2019-08-26 NOTE — LACTATION NOTE
This note was copied from a baby's chart. Initial Lactation Consultation:  Mom is 31yo  delivered today at 21  by repeat C/S gestation 39 3/7 weeks. Medical history significant for PCOS, prediabetes, hypertension. Mom breast fed her other two children 3 months with first, and 1 month with second. With second, she was advised to give bottles from the beginning. Mom has widely spaced, pendulous breasts and short nipples. Assisted mom to latch infant in football position with breast elevated on rolled washcloth and pillow supports. Sweet ease used to encourage suckling. Baby nursing well after delivery, deep latch obtained, mother is comfortable, baby feeding vigorously with rhythmic suck, swallow, breathe pattern, both breasts offered, baby is skin to skin for feeding. Quinter behaviors reviewed, Very sleepy in first 24 hours, mother must wake baby for feedings, offer hand expressed drops, baby usually will respond and feed vigorously 6-8 times in the first day, but is important to offer 8-12 times,  After baby wakes from deep sleep usually on the 2nd or 3rd day a new behavior pattern follows. Frequent feeding during this brief behavioral phase preceeding milk transition is called cluster feeding. Typical  behavior: baby becomes vigorous at the breast and wants to feed frequently- every 1-2 hours for several feedings. This is the normal process by which the baby demands his/her supply. This type of frequent feeding is the stimulation which causes lactogenesis II (milk coming in). Feeding Plan: Mother will keep baby skin to skin as often as possible, feed on demand, 8-12x/day , respond to feeding cues, obtain latch, listen for audible swallowing, be aware of signs of oxytocin release/ cramping,thirst,sleepiness while breastfeeding, offer both breasts,and will not limit feedings. Mother agrees to utilize breast massage while nursing to facilitate lactogenesis.  Due to mom's history of low supply and PCOS, plan to start mom pumping with hospital grade bilateral pump for 15 minutes after feeding. Also, consider pumping for a few minutes before feeds to fernando nipples.

## 2019-08-27 LAB
BASOPHILS # BLD: 0 K/UL (ref 0–0.1)
BASOPHILS NFR BLD: 0 % (ref 0–1)
DIFFERENTIAL METHOD BLD: ABNORMAL
EOSINOPHIL # BLD: 0.1 K/UL (ref 0–0.4)
EOSINOPHIL NFR BLD: 1 % (ref 0–7)
ERYTHROCYTE [DISTWIDTH] IN BLOOD BY AUTOMATED COUNT: 14 % (ref 11.5–14.5)
HCT VFR BLD AUTO: 29.5 % (ref 35–47)
HGB BLD-MCNC: 9.1 G/DL (ref 11.5–16)
IMM GRANULOCYTES # BLD AUTO: 0 K/UL (ref 0–0.04)
IMM GRANULOCYTES NFR BLD AUTO: 0 % (ref 0–0.5)
LYMPHOCYTES # BLD: 1.8 K/UL (ref 0.8–3.5)
LYMPHOCYTES NFR BLD: 19 % (ref 12–49)
MCH RBC QN AUTO: 25.3 PG (ref 26–34)
MCHC RBC AUTO-ENTMCNC: 30.8 G/DL (ref 30–36.5)
MCV RBC AUTO: 82.2 FL (ref 80–99)
MONOCYTES # BLD: 0.9 K/UL (ref 0–1)
MONOCYTES NFR BLD: 9 % (ref 5–13)
NEUTS SEG # BLD: 6.7 K/UL (ref 1.8–8)
NEUTS SEG NFR BLD: 71 % (ref 32–75)
NRBC # BLD: 0 K/UL (ref 0–0.01)
NRBC BLD-RTO: 0 PER 100 WBC
PLATELET # BLD AUTO: 294 K/UL (ref 150–400)
PMV BLD AUTO: 10.9 FL (ref 8.9–12.9)
RBC # BLD AUTO: 3.59 M/UL (ref 3.8–5.2)
WBC # BLD AUTO: 9.5 K/UL (ref 3.6–11)

## 2019-08-27 PROCEDURE — 85025 COMPLETE CBC W/AUTO DIFF WBC: CPT

## 2019-08-27 PROCEDURE — 36415 COLL VENOUS BLD VENIPUNCTURE: CPT

## 2019-08-27 PROCEDURE — 74011250637 HC RX REV CODE- 250/637: Performed by: OBSTETRICS & GYNECOLOGY

## 2019-08-27 PROCEDURE — 65410000002 HC RM PRIVATE OB

## 2019-08-27 PROCEDURE — 74011250636 HC RX REV CODE- 250/636: Performed by: ANESTHESIOLOGY

## 2019-08-27 RX ADMIN — OXYCODONE HYDROCHLORIDE 5 MG: 5 TABLET ORAL at 18:02

## 2019-08-27 RX ADMIN — ACETAMINOPHEN 650 MG: 325 TABLET, FILM COATED ORAL at 22:24

## 2019-08-27 RX ADMIN — ACETAMINOPHEN 650 MG: 325 TABLET, FILM COATED ORAL at 16:44

## 2019-08-27 RX ADMIN — MORPHINE SULFATE 6 MG: 10 INJECTION INTRAVENOUS at 05:35

## 2019-08-27 RX ADMIN — MORPHINE SULFATE 6 MG: 10 INJECTION INTRAVENOUS at 09:23

## 2019-08-27 RX ADMIN — ACETAMINOPHEN 650 MG: 325 TABLET, FILM COATED ORAL at 11:17

## 2019-08-27 RX ADMIN — OXYCODONE HYDROCHLORIDE 5 MG: 5 TABLET ORAL at 22:24

## 2019-08-27 RX ADMIN — OXYCODONE HYDROCHLORIDE 5 MG: 5 TABLET ORAL at 13:56

## 2019-08-27 NOTE — PROGRESS NOTES
Post-Operative Day Number 1 Progress Note    Patient doing well post-op day 1 from  delivery without significant complaints. Pain controlled on current medication. Voiding without difficulty, normal lochia. Passing flatus. Denies cp/sob/n/v.     Vitals:    Patient Vitals for the past 8 hrs:   BP Temp Pulse Resp SpO2   19 0530 114/72 97.8 °F (36.6 °C) 81 16 97 %   19 0130 120/69 98.5 °F (36.9 °C) 67 18 99 %     Temp (24hrs), Av °F (36.7 °C), Min:97.5 °F (36.4 °C), Max:98.6 °F (37 °C)      Vital signs stable, afebrile. Exam:  Patient without distress. Heart regular rate and rhythm   Lungs CTA b/l               Abdomen soft, fundus firm at level of umbilicus, non tender, positive bowel sounds. Bandage dry and clean without surrounding erythema. Lower extremities are negative for swelling, cords or tenderness. Lab/Data Review:  CBC:   Lab Results   Component Value Date/Time    WBC 9.5 2019 05:20 AM    HGB 9.1 (L) 2019 05:20 AM    HCT 29.5 (L) 2019 05:20 AM     2019 05:20 AM       Assessment and Plan:  Patient appears to be having uncomplicated post- course. Continue routine post-op care and maternal education. CHTN-normal Bp no meds. Benign labs. Boy-desires circ.

## 2019-08-27 NOTE — ROUTINE PROCESS
Bedside and Verbal shift change report given to AARON Davis (oncoming nurse) by Glenn Sr RN (offgoing nurse). Report included the following information SBAR.

## 2019-08-27 NOTE — LACTATION NOTE
Per mom, infant is latching better in the football position. Mom is asking about using the nipple shield. Nipples appear everted upon assessment, so I do not recommend shield use, due to the potential impact on breast milk supply. Will reconsider if the need presents itself. Mom states she was able to latch infant independently at the last feed. She will call for assistance as needed.

## 2019-08-27 NOTE — PROGRESS NOTES
Pain  Service, Anesthesia Post LSCS  Note:    Patient POD-  1, S/P LSCS under spinal with duramorph     Cardiovascular Function/Vital Signs  Visit Vitals  /72   Pulse 81   Temp 36.6 °C (97.8 °F)   Resp 16   Ht 5' 4\" (1.626 m)   Wt 118.4 kg (261 lb)   SpO2 97%   Breastfeeding? Unknown   BMI 44.80 kg/m²       Nausea/Vomiting: none    Postoperative hydration adequate    Pain:  Pain Scale 1: Numeric (0 - 10) (08/26/19 2339)  Pain Intensity 1: 5 (08/26/19 2339)   Pain managed     No numbness, weakness, headache or fever .   Spinal/epidural site clean    Plan  Continue current Pain  Regime   No anesthesia complication

## 2019-08-27 NOTE — ROUTINE PROCESS
1600- Bedside shift change report given to ALONZO Nash RN (oncoming nurse) by AARON Rodriguez RN (offgoing nurse). Report included the following information SBAR.

## 2019-08-27 NOTE — LACTATION NOTE
This note was copied from a baby's chart. Mom says she has been having difficulty getting the baby to stay latched. She was asking about using a nipple shield. Mom has large, heavy breasts. Her nipples fernando but are short and because the breasts are heavy they pull out of baby's mouth frequently. I set baby up in the football position and then helped mom get him latched. He was having some difficulty getting the nipple deep enough in his mouth to maintain the latch. We had to try latch and relatch him several times before he was able to hold on to the nipple. Mom will continue to pump after nursing for extra breast stimulation. She will call out as needed for help getting baby latched.

## 2019-08-27 NOTE — ROUTINE PROCESS
Bedside shift change report given to Se Berkowitz RN (oncoming nurse) by Barbie Amos RN (offgoing nurse). Report included the following information SBAR, Kardex, Procedure Summary, Intake/Output and MAR.

## 2019-08-28 VITALS
HEIGHT: 64 IN | OXYGEN SATURATION: 98 % | BODY MASS INDEX: 44.56 KG/M2 | DIASTOLIC BLOOD PRESSURE: 69 MMHG | TEMPERATURE: 97.9 F | HEART RATE: 98 BPM | WEIGHT: 261 LBS | SYSTOLIC BLOOD PRESSURE: 119 MMHG | RESPIRATION RATE: 14 BRPM

## 2019-08-28 PROCEDURE — 74011250637 HC RX REV CODE- 250/637: Performed by: ADVANCED PRACTICE MIDWIFE

## 2019-08-28 PROCEDURE — 74011250637 HC RX REV CODE- 250/637: Performed by: OBSTETRICS & GYNECOLOGY

## 2019-08-28 RX ORDER — OXYCODONE HYDROCHLORIDE 5 MG/1
5 TABLET ORAL
Qty: 20 TAB | Refills: 0 | Status: SHIPPED | OUTPATIENT
Start: 2019-08-28 | End: 2019-08-31

## 2019-08-28 RX ORDER — HYDROMORPHONE HYDROCHLORIDE 4 MG/1
4 TABLET ORAL
Status: DISCONTINUED | OUTPATIENT
Start: 2019-08-28 | End: 2019-08-28 | Stop reason: HOSPADM

## 2019-08-28 RX ADMIN — OXYCODONE HYDROCHLORIDE 5 MG: 5 TABLET ORAL at 08:39

## 2019-08-28 RX ADMIN — ACETAMINOPHEN 650 MG: 325 TABLET, FILM COATED ORAL at 13:04

## 2019-08-28 RX ADMIN — OXYCODONE HYDROCHLORIDE 5 MG: 5 TABLET ORAL at 13:04

## 2019-08-28 RX ADMIN — OXYCODONE HYDROCHLORIDE 5 MG: 5 TABLET ORAL at 16:50

## 2019-08-28 RX ADMIN — ACETAMINOPHEN 650 MG: 325 TABLET, FILM COATED ORAL at 16:50

## 2019-08-28 RX ADMIN — DOCUSATE SODIUM 100 MG: 100 CAPSULE, LIQUID FILLED ORAL at 00:23

## 2019-08-28 RX ADMIN — ACETAMINOPHEN 650 MG: 325 TABLET, FILM COATED ORAL at 08:39

## 2019-08-28 RX ADMIN — ACETAMINOPHEN 650 MG: 325 TABLET, FILM COATED ORAL at 04:23

## 2019-08-28 RX ADMIN — HYDROMORPHONE HYDROCHLORIDE 4 MG: 4 TABLET ORAL at 00:23

## 2019-08-28 RX ADMIN — OXYCODONE HYDROCHLORIDE 5 MG: 5 TABLET ORAL at 04:23

## 2019-08-28 NOTE — DISCHARGE SUMMARY
Obstetrical Discharge Summary     Name: Luis Shanks MRN: [id number]  SSN: [id number]    YOB: 1990  Age: 29 y.o. Sex: female      Allergies: Tomato; Citrus flavor; Ibuprofen; and Seafood [shellfish containing products]    Admit Date: 2019    Discharge Date: 2019     Admitting Physician: Ritesh Bowers MD     Attending Physician:  Baldomero Adkins MD     * Admission Diagnoses: Pregnancy [Z34.90]    * Discharge Diagnoses:   Information for the patient's :  Chanel López [[id number]]   Delivery of a 3.315 kg male infant via , Low Transverse on 2019 at 10:50 AM  by Kayla Mitchell. Apgars were 9  and 9 .        Additional Diagnoses:   Hospital Problems as of 2019 Date Reviewed: 2019          Codes Class Noted - Resolved POA    Pregnancy ICD-10-CM: Z34.90  ICD-9-CM: V22.2  2016 - Present Unknown             Lab Results   Component Value Date/Time    ABO/Rh(D) O POSITIVE 2019 10:29 AM    Rubella, External immune 2019    GrBStrep, External Positive 2019    ABO,Rh O Positive 01/10/2018      Immunization History   Administered Date(s) Administered    Influenza Vaccine 2015    MMR 2015    Tdap 2016       * Procedures:  section  Procedure(s) with comments:   SECTION - EDC 19      [place]  Depression Scale  I have been able to laugh and see the funny side of things: As much as I always could  I have looked forward with enjoyment to things: As much as I ever did  I have blamed myself unnecessarily when things went wrong: No, never  I have been anxious or worried for no good reason: No, not at all  I have felt scared or panicky for no very good reason: No, not at all  Things have been getting on top of me: No, most of the time I have coped quite well  I have been so unhappy that I have had difficulty sleeping: No, not at all  I have felt sad or miserable: No, not at all  I have been so unhappy that I have been crying: No, never  The thought of harming myself has occurred to me: Never  Total Score: 1    * Discharge Condition: stable    * Hospital Course: Normal hospital course following the delivery. * Disposition: Home    Discharge Medications:   Current Discharge Medication List          * Follow-up Care/Patient Instructions:   Activity: No sex for 6 weeks and No driving while on analgesics  Diet: Regular Diet  Wound Care: Keep wound clean and dry    Follow-up Information     Follow up With Specialties Details Why Contact Info    Breastfeeding Support Group Lactation Services   Meets 1st And 3rd Tuesday Each Month From 10:00-11:00  [id number] [address]  ([address])  [facility] [id number]    Postpartum Support Group Social and [address] 1st And 3rd Saturdays Of Each Month 9:00am-10:30  [address]  [address] [place] [address](behind [place] [id number]

## 2019-08-28 NOTE — LACTATION NOTE
This note was copied from a baby's chart. Mom said she was latching the baby on her own yesterday but is having difficulty this morning. She said baby is sleepy and won't open his mouth wide enough to get the nipple deep enough in his mouth. I put a small amount of sweetease on moms nipple and in baby's mouth. He perked up and began showing feeding cues. After a couple attempts in the football hold baby was able to get a deep latch on the nipple. I put a small amount of sweetease in his mouth at the breast. He then began to suck rhythmically with the occasional swallow noted. Mom has a breast pump at the bedside. She has not been pumping but I recommended that mom pump a few times today to help stimulate the milk production. Mom said she is not able to get the baby latched on the right breast. She will call out at the next feeding to help him get latched on the right.

## 2019-08-28 NOTE — DISCHARGE INSTRUCTIONS
POSTPARTUM DISCHARGE INSTRUCTIONS       Name:  Jovanni Arnett  YOB: 1990  Admission Diagnosis:  Pregnancy [Z34.90]     Discharge Diagnosis:    Problem List as of 2019 Date Reviewed: 2019          Codes Class Noted - Resolved    PROM (premature rupture of membranes) ICD-10-CM: O42.90  ICD-9-CM: 658.10  2018 - Present        PCOS (polycystic ovarian syndrome) ICD-10-CM: E28.2  ICD-9-CM: 256.4  2017 - Present        Obesity, morbid, BMI 40.0-49.9 ([facility] ICD-10-CM: E66.01  ICD-9-CM: 278.01  3/31/2017 - Present        Pregnancy ICD-10-CM: Z34.90  ICD-9-CM: V22.2  2016 - Present         delivery delivered ICD-10-CM: O82  ICD-9-CM: 669.71  2016 - Present        Encounter for full-term uncomplicated delivery [id number]: O80  ICD-9-CM: 650  2016 - Present        GBS (group B Streptococcus carrier), +RV culture, currently pregnant ICD-10-CM: O99.820  ICD-9-CM: V23.89, V02.51  2016 - Present        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  ICD-9-CM: 300.29  2015 - Present        Elevated blood pressure (not hypertension) ICD-10-CM: R03.0  ICD-9-CM: 796.2  2015 - Present        Prediabetes ICD-10-CM: R73.03  ICD-9-CM: 790.29  2015 - Present        HTN, age 0-24 ICD-10-CM: I10  ICD-9-CM: 401.9  2015 - Present        HA (headache) ICD-10-CM: R51  ICD-9-CM: 784.0  3/20/2015 - Present        Carpal tunnel syndrome on both sides ICD-10-CM: G56.03  ICD-9-CM: 354.0  3/20/2015 - Present        BMI 39.0-39.9,adult ICD-10-CM: Z68.39  ICD-9-CM: V85.39  3/20/2015 - Present        Family planning ICD-10-CM: Z30.09  ICD-9-CM: V25.09  3/20/2015 - Present        RESOLVED: HTN (hypertension) ICD-10-CM: I10  ICD-9-CM: 401.9  3/20/2015 - 2015            Attending Physician:  Hamida Hinojosa MD    Delivery Type:   Section: What to Expect at Home    Your Recovery:  A  section, or , is surgery to deliver your baby through a cut, called an incision that the doctor makes in your lower belly and uterus. You may have some pain in your lower belly and need pain medicine for 1 to 2 weeks. You can expect some vaginal bleeding for several weeks. You will probably need about 6 weeks to fully recover. It is important to take it easy while the incision is healing. Avoid heavy lifting, strenuous activities, or exercises that strain the belly muscles while you are recovering. Ask a family member or friend for help with housework, cooking, and shopping. This care sheet gives you a general idea about how long it will take for you to recover. But each person recovers at a different pace. Follow the steps below to get better as quickly as possible. How can you care for yourself at home? Activity  · Rest when you feel tired. Getting enough sleep will help you recover. · Try to walk each day. Start by walking a little more than you did the day before. Bit by bit, increase the amount you walk. Walking boosts blood flow and helps prevent pneumonia, constipation, and blood clots. · Avoid strenuous activities, such as bicycle riding, jogging, weightlifting, and aerobic exercise, for 6 weeks or until your doctor says it is okay. · Until your doctor says it is okay, do not lift anything heavier than your baby. · Do not do sit-ups or other exercise that strain the belly muscles for 6 weeks or until your doctor says it is okay. · Hold a pillow over your incision when you cough or take deep breaths. This will support your belly and decrease your pain. · You may shower as usual. Pat the incision dry when you are done. · You will have some vaginal bleeding. Wear sanitary pads. Do not douche or use tampons until your doctor says it is okay. · Ask your doctor when you can drive again. · You will probably need to take at least 6 weeks off work. It depends on the type of work you do and how you feel.   · Wait until you are healed (about 4 to 6 weeks) before you have sexual intercourse. Your doctor will tell you when it is okay to have sex. · Talk to your doctor about birth control. You can get pregnant even before your period returns. Also, you can get pregnant while you are breast-feeding. Incision care  Your skin is your body's first line of defense against germs, but an incision site leaves an easy way for germs to enter your body. To prevent infection:  · Clean your hands frequently and before and after changing any touching any dressings. · If you have strips of tape on the incision, leave the tape on for a week or until it falls off. · Look at your incision closely every day for any changes. Contact your doctor if you experience any signs of infection, such as fever or increased redness at the surgical site. · Wash the area daily with warm, soapy water, and pat it dry. Don't use hydrogen peroxide or alcohol, which can slow healing. You may cover the area with a gauze bandage if it weeps or rubs against clothing. Change the bandage every day. · Keep the area clean and dry. Diet  · You can eat your normal diet. If your stomach is upset, try bland, low-fat foods like plain rice, broiled chicken, toast, and yogurt. · Drink plenty of fluids (unless your doctor tells you not to). · You may notice that your bowel movements are not regular right after your surgery. This is common. Try to avoid constipation and straining with bowel movements. You may want to take a fiber supplement every day. If you have not had a bowel movement after a couple of days, ask your doctor about taking a mild laxative. · If you are breast-feeding, do not drink any alcohol. Medicines  · Take pain medicines exactly as directed. · If the doctor gave you a prescription medicine for pain, take it as prescribed.   · If you are not taking a prescription pain medicine, ask your doctor if you can take an over-the-counter medicine such as acetaminophen (Tylenol), ibuprofen (Advil, Motrin), or naproxen (Aleve), for cramps. Read and follow all instructions on the label. Do not take aspirin, because it can cause more bleeding. Do not take acetaminophen (Tylenol) and other acetaminophen containing medications (i.e. Percocet) at the same time. · If you think your pain medicine is making you sick to your stomach:  · Take your medicine after meals (unless your doctor has told you not to). · Ask your doctor for a different pain medicine. · If your doctor prescribed antibiotics, take them as directed. Do not stop taking them just because you feel better. You need to take the full course of antibiotics. Mental Health  · Many women get the \"baby blues\" during the first few days after childbirth. You may lose sleep, feel irritable, and cry easily. You may feel happy one minute and sad the next. Hormone changes are one cause of these emotional changes. Also, the demands of a new baby, along with visits from relatives or other family needs, add to a mother's stress. The \"baby blues\" often peak around the fourth day. Then they ease up in less than 2 weeks. · If your moodiness or anxiety lasts for more than 2 weeks, or if you feel like life is not worth living, you may have postpartum depression. This is different for each mother. Some mothers with serious depression may worry intensely about their infant's well-being. Others may feel distant from their child. Some mothers might even feel that they might harm their baby. A mother may have signs of paranoia, wondering if someone is watching her. · With all the changes in your life, you may not know if you are depressed. Pregnancy sometimes causes changes in how you feel that are similar to the symptoms of depression. · Symptoms of depression include:  · Feeling sad or hopeless and losing interest in daily activities. These are the most common symptoms of depression. · Sleeping too much or not enough. · Feeling tired.  You may feel as if you have no energy. · Eating too much or too little. · [employer] ([employer]) offers a Warm line; Chat with the Expert phone sessions; Information and Articles about Pregnancy and Postpartum Mood Disorders; Comprehensive List of Free Support Groups; Knowledgeable local coordinators who will offer support, information, and resources; Guide to Resources on [employer]; Calendar of events in the  mood disorders community; Latest News and Research; and [facility] [address] for [employer]. Remember - You are not alone; You are not to blame; With help, you will be well. 5-059-894-PPD(4773). WWW. POSTPARTUM. NET   · Writing or talking about death, such as writing suicide notes or talking about guns, knives, or pills. Keep the numbers for these national suicide hotlines: 6-325-903-TALK (2-584.743.5797) and 6-766-ASCDZQZ (5-953.499.9343). If you or someone you know talks about suicide or feeling hopeless, get help right away. Other instructions  · If you breast-feed your baby, you may be more comfortable while you are healing if you place the baby so that he or she is not resting on your belly. Try tucking your baby under your arm, with his or her body along the side you will be feeding on. Support your baby's upper body with your arm. With that hand you can control your baby's head to bring his or her mouth to your breast. This is sometimes called the football hold. Follow-up care is a key part of your treatment and safety. Be sure to make and go to all appointments, and call your doctor if you are having problems. It's also a good idea to know your test results and keep a list of the medicines you take. When should you call for help? Call 911 anytime you think you may need emergency care. For example, call if:  · You are thinking of hurting yourself, your baby, or anyone else. · You passed out (lost consciousness).   · You have symptoms of a blood clot in your lung (called a pulmonary embolism). These may include:  · Sudden chest pain. · Trouble breathing. · Coughing up blood. Call your doctor now or seek immediate medical care if:    · You have severe vaginal bleeding. · You are soaking through a pad each hour for 2 or more hours. · Your vaginal bleeding seems to be getting heavier or is still bright red 4 days after delivery. · You are dizzy or lightheaded, or you feel like you may faint. · You are vomiting or cannot keep fluids down. · You have a fever. · You have new or more belly pain. · You have loose stitches, or your incision comes open. · You have symptoms of infection, such as:  · Increased pain, swelling, warmth, or redness. · Red streaks leading from the incision. · Pus draining from the incision. · A fever  · You pass tissue (not just blood). · Your vaginal discharge smells bad. · Your belly feels tender or full and hard. · Your breasts are continuously painful or red. · You feel sad, anxious, or hopeless for more than a few days. · You have sudden, severe pain in your belly. · You have symptoms of a blood clot in your leg (called a deep vein thrombosis), such as:  · Pain in your calf, back of the knee, thigh, or groin. · Redness and swelling in your leg or groin. · You have symptoms of preeclampsia, such as:  · Sudden swelling of your face, hands, or feet. · New vision problems (such as dimness or blurring). · A severe headache. · Your blood pressure is higher than it should be or rises suddenly. · You have new nausea or vomiting. Watch closely for changes in your health, and be sure to contact your doctor if you have any problems. Additional Information:  Preventing Infection at Home    We care about preventing infection and avoiding the spread of germs - not only when you are in the hospital but also when you return home.  When you return home from the hospital, it's important to take the following steps to help prevent infection and avoid spreading germs that could infect you and others. Ask everyone in your home to follow these guidelines, too. Clean Your Hands  · Clean your hands whenever your hands are visibly dirty, before you eat, before or after touching your mouth, nose or eyes, and before preparing food. Clean them after contact with body fluids, using the restroom, touching animals or changing diapers. · When washing hands, wet them with warm water and work up a lather. Rub hands for at least 15 seconds, then rinse them and pat them dry with a clean towel or paper towel. · When using hand sanitizers, it should take about 15 seconds to rub your hands dry. If not, you probably didn't apply enough . Cover Your Sneeze or Cough  Germs are released into the air whenever you sneeze or cough. To prevent the spread of infection:  · Turn away from other people before coughing or sneezing. · Cover your mouth or nose with a tissue when you cough or sneeze. Put the tissue in the trash. · If you don't have a tissue, cough or sneeze into your upper sleeve, not your hands. · Always clean your hands after coughing or sneezing. Care for Wounds  Your skin is your body's first line of defense against germs, but an open wound leaves an easy way for germs to enter your body. To prevent infection:  · Clean your hands before and after changing wound dressings, and wear gloves to change dressings if recommended by your doctor. · Take special care with IV lines or other devices inserted into the body. If you must touch them, clean your hands first.  · Follow any specific instructions from your doctor to care for your wounds. Contact your doctor if you experience any signs of infection, such as fever or increased redness at the surgical or wound site. Keep a Clean Home  · Clean or wipe commonly touched hard surfaces like door handles, sinks, tabletops, phones and TV remotes.   · Use products labeled \"disinfectant\" to kill harmful bacteria and viruses. · Use a clean cloth or paper towel to clean and dry surfaces. Wiping surfaces with a dirty dishcloth, sponge or towel will only spread germs. · Never share toothbrushes, ferrari, drinking glasses, utensils, razor blades, face cloths or bath towels to avoid spreading germs. · Be sure that the linens that you sleep on are clean. · Keep pets away from wounds and wash your hands after touching pets, their toys or bedding. We care about you and your health. Remember, preventing infections is a   team effort between you, your family, friends and health care providers. These are general instructions for a healthy lifestyle:    No smoking/ No tobacco products/ Avoid exposure to second hand smoke    Surgeon General's Warning:  Quitting smoking now greatly reduces serious risk to your health. Obesity, smoking, and sedentary lifestyle greatly increases your risk for illness    A healthy diet, regular physical exercise & weight monitoring are important for maintaining a healthy lifestyle    Recognize signs and symptoms of STROKE:    F-face looks uneven    A-arms unable to move or move unevenly    S-speech slurred or non-existent    T-time-call 911 as soon as signs and symptoms begin - DO NOT go       back to bed or wait to see if you get better - TIME IS BRAIN. I have had the opportunity to make my options or choices for discharge. I have received and understand these instructions. Postpartum Support Group-Offering Hope and Help for New Mothers  The Postpartum Support Group meets on the 1st and 3rd Saturdays of each month from 9:00am-10:30am in the [address] [place] [address], located on the [facility] floor, behind the cafeteria.      Breastfeeding Support Group  [facility] Nursing Mothers Group meets the 1st and 3rd Tuesday of each month in the [facility] from 10:00-11:00, (located behind [employer] on the first floor) Meetings are facilitated by board certified lactation consultants and all breastfeeding moms and their infants are invited.

## 2019-08-28 NOTE — PROGRESS NOTES
Progress Note                               Patient: Kimberlee Pak MRN: [id number]  SSN: [id number]    YOB: 1990  Age: 29 y.o. Sex: female      2 Days Post-Op     Subjective:     Patient doing well postpartum without significant complaints. Voiding without difficulty. Patient reports normal lochia. Passing flatus. Breastfeeding: Yes     Objective:     Patient Vitals for the past 18 hrs:   Temp Pulse Resp BP SpO2   19 0900 98.6 °F (37 °C) 91 14 121/72 98 %   19 0423 97.9 °F (36.6 °C)       19 0007 99.6 °F (37.6 °C) 98 18 148/88    19 2030 99.7 °F (37.6 °C) 90 16 148/78    19 1640 98.4 °F (36.9 °C) 82 16 150/80        Temp (24hrs), Av.8 °F (37.1 °C), Min:97.9 °F (36.6 °C), Max:99.7 °F (37.6 °C)      Physical Exam:    Patient without distress. Heart: Regular rate and rhythm  Lung: clear to auscultation throughout lung fields, no wheezes, no rales, no rhonchi and normal respiratory effort  Abdomen: soft, nontender  Incision: incision dressing clean and dry  Fundus: firm and non tender  Lower Extremities: no calf tenderness     Lab/Data Review:  CBC:    Recent Labs     19  0520   WBC 9.5   HGB 9.1*   HCT 29.5*          Assessment and Plan:     Patient appears to be having an uncomplicated post- course. Continue routine postoperative care and maternal education. Boy- circ today  CHTN- no meds- will monitor BPs  Pt desires early discharge home today. Will followup in the office in one week for dressing removal and incision check.

## 2019-08-28 NOTE — ROUTINE PROCESS
2330: Verbal shift change report given to DANA Uribe RN (oncoming nurse) by Germaine Botello RN (offgoing nurse). Report included the following information SBAR.     0007: Patients vitals obtained and patient reports severe pain unrelieved by tylenol and Roxicodone given 1.5 hours prior. Patient reports 10/10 pain in incision that is radiating to legs. Patient with low grade temperature- given incentive spirometer and educated on use. Call to Aye Cuadra CNM to report findings. Orders received. 0020: Patient sleeping. FOB woke patient up to take pain medication. Educated patient on importance of using incentive spirometer and ambulation. Encouraged PO hydration. Educated on safe sleep for  to include placing back in bassinet when drowsy.

## 2021-04-29 ENCOUNTER — HOSPITAL ENCOUNTER (OUTPATIENT)
Dept: PERINATAL CARE | Age: 31
Discharge: HOME | End: 2021-04-29
Attending: OBSTETRICS & GYNECOLOGY
Payer: MEDICAID

## 2021-04-29 PROCEDURE — 76811 OB US DETAILED SNGL FETUS: CPT | Performed by: OBSTETRICS & GYNECOLOGY

## 2021-06-24 ENCOUNTER — HOSPITAL ENCOUNTER (OUTPATIENT)
Dept: PERINATAL CARE | Age: 31
Discharge: HOME | End: 2021-06-24
Payer: MEDICAID

## 2021-06-24 PROCEDURE — 76816 OB US FOLLOW-UP PER FETUS: CPT | Performed by: OBSTETRICS & GYNECOLOGY

## 2022-03-18 PROBLEM — O42.90 PROM (PREMATURE RUPTURE OF MEMBRANES): Status: ACTIVE | Noted: 2018-07-25

## 2022-03-18 PROBLEM — E28.2 PCOS (POLYCYSTIC OVARIAN SYNDROME): Status: ACTIVE | Noted: 2017-04-13

## 2022-03-20 PROBLEM — E66.01 OBESITY, MORBID, BMI 40.0-49.9: Status: ACTIVE | Noted: 2017-03-31

## 2022-09-26 NOTE — ROUTINE PROCESS
Bedside shift change report given to Benjamin Shelton RN (oncoming nurse) by Aliza Rolle RN (offgoing nurse). Report included the following information SBAR, Procedure Summary, Intake/Output and MAR. Nasal Turnover Hinge Flap Text: The defect edges were debeveled with a #15 scalpel blade.  Given the size, depth, location of the defect and the defect being full thickness a nasal turnover hinge flap was deemed most appropriate.  Using a sterile surgical marker, an appropriate hinge flap was drawn incorporating the defect. The area thus outlined was incised with a #15 scalpel blade. The flap was designed to recreate the nasal mucosal lining and the alar rim. The skin margins were undermined to an appropriate distance in all directions utilizing iris scissors.

## 2025-03-10 SDOH — HEALTH STABILITY: PHYSICAL HEALTH: ON AVERAGE, HOW MANY DAYS PER WEEK DO YOU ENGAGE IN MODERATE TO STRENUOUS EXERCISE (LIKE A BRISK WALK)?: 2 DAYS

## 2025-03-10 SDOH — HEALTH STABILITY: PHYSICAL HEALTH: ON AVERAGE, HOW MANY MINUTES DO YOU ENGAGE IN EXERCISE AT THIS LEVEL?: 30 MIN

## 2025-03-13 ENCOUNTER — OFFICE VISIT (OUTPATIENT)
Facility: CLINIC | Age: 35
End: 2025-03-13
Payer: COMMERCIAL

## 2025-03-13 VITALS
WEIGHT: 255.3 LBS | BODY MASS INDEX: 42.53 KG/M2 | RESPIRATION RATE: 18 BRPM | SYSTOLIC BLOOD PRESSURE: 139 MMHG | TEMPERATURE: 96.1 F | HEART RATE: 84 BPM | HEIGHT: 65 IN | OXYGEN SATURATION: 100 % | DIASTOLIC BLOOD PRESSURE: 84 MMHG

## 2025-03-13 DIAGNOSIS — Z11.3 SCREENING EXAMINATION FOR STI: ICD-10-CM

## 2025-03-13 DIAGNOSIS — N92.0 MENORRHAGIA WITH REGULAR CYCLE: ICD-10-CM

## 2025-03-13 DIAGNOSIS — Z91.013 SHELLFISH ALLERGY: ICD-10-CM

## 2025-03-13 DIAGNOSIS — Z76.89 ESTABLISHING CARE WITH NEW DOCTOR, ENCOUNTER FOR: Primary | ICD-10-CM

## 2025-03-13 DIAGNOSIS — D50.9 IRON DEFICIENCY ANEMIA, UNSPECIFIED IRON DEFICIENCY ANEMIA TYPE: ICD-10-CM

## 2025-03-13 DIAGNOSIS — R73.09 ELEVATED GLUCOSE: ICD-10-CM

## 2025-03-13 DIAGNOSIS — E66.01 OBESITY, MORBID, BMI 40.0-49.9: ICD-10-CM

## 2025-03-13 PROBLEM — O42.90 PROM (PREMATURE RUPTURE OF MEMBRANES): Status: RESOLVED | Noted: 2018-07-25 | Resolved: 2025-03-13

## 2025-03-13 LAB
ALBUMIN SERPL-MCNC: 3.5 G/DL (ref 3.5–5)
ALBUMIN/GLOB SERPL: 0.8 (ref 1.1–2.2)
ALP SERPL-CCNC: 108 U/L (ref 45–117)
ALT SERPL-CCNC: 31 U/L (ref 12–78)
ANION GAP SERPL CALC-SCNC: 5 MMOL/L (ref 2–12)
AST SERPL-CCNC: 11 U/L (ref 15–37)
BILIRUB SERPL-MCNC: 0.2 MG/DL (ref 0.2–1)
BUN SERPL-MCNC: 13 MG/DL (ref 6–20)
BUN/CREAT SERPL: 15 (ref 12–20)
CALCIUM SERPL-MCNC: 9.2 MG/DL (ref 8.5–10.1)
CHLORIDE SERPL-SCNC: 108 MMOL/L (ref 97–108)
CO2 SERPL-SCNC: 25 MMOL/L (ref 21–32)
CREAT SERPL-MCNC: 0.84 MG/DL (ref 0.55–1.02)
ERYTHROCYTE [DISTWIDTH] IN BLOOD BY AUTOMATED COUNT: 15.8 % (ref 11.5–14.5)
EST. AVERAGE GLUCOSE BLD GHB EST-MCNC: 123 MG/DL
FERRITIN SERPL-MCNC: 5 NG/ML (ref 26–388)
GLOBULIN SER CALC-MCNC: 4.2 G/DL (ref 2–4)
GLUCOSE SERPL-MCNC: 111 MG/DL (ref 65–100)
HBA1C MFR BLD: 5.9 % (ref 4–5.6)
HCT VFR BLD AUTO: 34.5 % (ref 35–47)
HGB BLD-MCNC: 10.3 G/DL (ref 11.5–16)
HIV 1+2 AB+HIV1 P24 AG SERPL QL IA: NONREACTIVE
HIV 1/2 RESULT COMMENT: NORMAL
MCH RBC QN AUTO: 23.5 PG (ref 26–34)
MCHC RBC AUTO-ENTMCNC: 29.9 G/DL (ref 30–36.5)
MCV RBC AUTO: 78.8 FL (ref 80–99)
NRBC # BLD: 0 K/UL (ref 0–0.01)
NRBC BLD-RTO: 0 PER 100 WBC
PLATELET # BLD AUTO: 469 K/UL (ref 150–400)
PMV BLD AUTO: 11.2 FL (ref 8.9–12.9)
POTASSIUM SERPL-SCNC: 4.3 MMOL/L (ref 3.5–5.1)
PROT SERPL-MCNC: 7.7 G/DL (ref 6.4–8.2)
RBC # BLD AUTO: 4.38 M/UL (ref 3.8–5.2)
SODIUM SERPL-SCNC: 138 MMOL/L (ref 136–145)
WBC # BLD AUTO: 7.3 K/UL (ref 3.6–11)

## 2025-03-13 PROCEDURE — 99204 OFFICE O/P NEW MOD 45 MIN: CPT | Performed by: NURSE PRACTITIONER

## 2025-03-13 RX ORDER — EPINEPHRINE 0.3 MG/.3ML
0.3 INJECTION SUBCUTANEOUS ONCE
Qty: 0.3 ML | Refills: 0 | Status: SHIPPED | OUTPATIENT
Start: 2025-03-13 | End: 2025-03-13

## 2025-03-13 SDOH — ECONOMIC STABILITY: FOOD INSECURITY: WITHIN THE PAST 12 MONTHS, THE FOOD YOU BOUGHT JUST DIDN'T LAST AND YOU DIDN'T HAVE MONEY TO GET MORE.: NEVER TRUE

## 2025-03-13 SDOH — ECONOMIC STABILITY: FOOD INSECURITY: WITHIN THE PAST 12 MONTHS, YOU WORRIED THAT YOUR FOOD WOULD RUN OUT BEFORE YOU GOT MONEY TO BUY MORE.: NEVER TRUE

## 2025-03-13 ASSESSMENT — PATIENT HEALTH QUESTIONNAIRE - PHQ9
1. LITTLE INTEREST OR PLEASURE IN DOING THINGS: NOT AT ALL
SUM OF ALL RESPONSES TO PHQ QUESTIONS 1-9: 0
2. FEELING DOWN, DEPRESSED OR HOPELESS: NOT AT ALL
SUM OF ALL RESPONSES TO PHQ QUESTIONS 1-9: 0

## 2025-03-13 NOTE — PROGRESS NOTES
\"Have you been to the ER, urgent care clinic since your last visit?  Hospitalized since your last visit?\"    NO    “Have you seen or consulted any other health care providers outside our system since your last visit?”    NO     “Have you had a pap smear?”    NO    Date of last Cervical Cancer screen (HPV or PAP): 4/14/2015            Chief Complaint   Patient presents with    Established New Doctor     /84 (BP Site: Right Upper Arm, Patient Position: Sitting, BP Cuff Size: Medium Adult)   Pulse 84   Temp (!) 96.1 °F (35.6 °C) (Oral)   Resp 18   Ht 1.651 m (5' 5\")   Wt 115.8 kg (255 lb 4.8 oz)   LMP 02/28/2025   SpO2 100%   BMI 42.48 kg/m²     
that    SOCIAL HISTORY  She does not smoke cigarettes, consume alcohol, or use other drugs. 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 There is no family history of thyroid cancers or disorders, or any other cancers.    ALLERGIES  She has had an allergic reaction to COCONUT.           Reviewed PmHx, RxHx, FmHx, SocHx, AllgHx and updated in chart.  Family History   Problem Relation Age of Onset    Diabetes Mother     Hypertension Mother     Heart Attack Mother     Hypertension Sister     Hypertension Maternal Grandmother     No Known Problems Daughter        Past Medical History:   Diagnosis Date    Anemia since birth    GBS (group B Streptococcus carrier), +RV culture, currently pregnant 01/20/2016    Gestational hypertension     Headache     migraines    Hypertension since birth    PROM (premature rupture of membranes) 07/25/2018      Social History     Socioeconomic History    Marital status:      Spouse name: None    Number of children: None    Years of education: None    Highest education level: None   Tobacco Use    Smoking status: Never    Smokeless tobacco: Never   Substance and Sexual Activity    Alcohol use: No    Drug use: No     Social Drivers of Health     Food Insecurity: No Food Insecurity (3/13/2025)    Hunger Vital Sign     Worried About Running Out of Food in the Last Year: Never true     Ran Out of Food in the Last Year: Never true   Transportation Needs: No Transportation Needs (3/13/2025)    PRAPARE - Transportation     Lack of Transportation (Medical): No     Lack of Transportation (Non-Medical): No   Physical Activity: Insufficiently Active (3/10/2025)    Exercise Vital Sign     Days of Exercise per Week: 2 days     Minutes of Exercise per Session: 30 min   Housing Stability: Low Risk  (3/13/2025)    Housing Stability Vital Sign     Unable to Pay for Housing in the Last Year: No     Number

## 2025-03-14 ENCOUNTER — RESULTS FOLLOW-UP (OUTPATIENT)
Facility: CLINIC | Age: 35
End: 2025-03-14

## 2025-03-14 DIAGNOSIS — D50.0 IRON DEFICIENCY ANEMIA DUE TO CHRONIC BLOOD LOSS: Primary | ICD-10-CM

## 2025-03-14 LAB
HCV AB SERPL QL IA: NORMAL
HCV IGG SERPL QL IA: NON REACTIVE S/CO RATIO
T PALLIDUM AB SER QL IA: NON REACTIVE

## 2025-03-14 RX ORDER — FERROUS SULFATE 325(65) MG
325 TABLET ORAL 2 TIMES DAILY
Qty: 60 TABLET | Refills: 5 | Status: SHIPPED | OUTPATIENT
Start: 2025-03-14

## 2025-03-21 DIAGNOSIS — E66.01 OBESITY, MORBID, BMI 40.0-49.9: Primary | ICD-10-CM

## 2025-03-21 RX ORDER — PHENTERMINE HYDROCHLORIDE 15 MG/1
15 CAPSULE ORAL EVERY MORNING
Qty: 30 CAPSULE | Refills: 0 | Status: SHIPPED | OUTPATIENT
Start: 2025-03-21 | End: 2025-04-20

## 2025-06-16 ENCOUNTER — PATIENT MESSAGE (OUTPATIENT)
Facility: CLINIC | Age: 35
End: 2025-06-16

## 2025-06-16 ENCOUNTER — OFFICE VISIT (OUTPATIENT)
Facility: CLINIC | Age: 35
End: 2025-06-16
Payer: COMMERCIAL

## 2025-06-16 VITALS
BODY MASS INDEX: 42.49 KG/M2 | DIASTOLIC BLOOD PRESSURE: 88 MMHG | RESPIRATION RATE: 16 BRPM | SYSTOLIC BLOOD PRESSURE: 151 MMHG | TEMPERATURE: 97 F | WEIGHT: 255 LBS | HEART RATE: 63 BPM | OXYGEN SATURATION: 99 % | HEIGHT: 65 IN

## 2025-06-16 DIAGNOSIS — R73.03 PREDIABETES: Primary | ICD-10-CM

## 2025-06-16 DIAGNOSIS — E28.2 PCOS (POLYCYSTIC OVARIAN SYNDROME): ICD-10-CM

## 2025-06-16 DIAGNOSIS — D50.9 IRON DEFICIENCY ANEMIA, UNSPECIFIED IRON DEFICIENCY ANEMIA TYPE: ICD-10-CM

## 2025-06-16 DIAGNOSIS — E66.01 OBESITY, MORBID, BMI 40.0-49.9 (HCC): ICD-10-CM

## 2025-06-16 PROCEDURE — 99214 OFFICE O/P EST MOD 30 MIN: CPT | Performed by: NURSE PRACTITIONER

## 2025-06-16 SDOH — ECONOMIC STABILITY: FOOD INSECURITY: WITHIN THE PAST 12 MONTHS, THE FOOD YOU BOUGHT JUST DIDN'T LAST AND YOU DIDN'T HAVE MONEY TO GET MORE.: NEVER TRUE

## 2025-06-16 SDOH — ECONOMIC STABILITY: FOOD INSECURITY: WITHIN THE PAST 12 MONTHS, YOU WORRIED THAT YOUR FOOD WOULD RUN OUT BEFORE YOU GOT MONEY TO BUY MORE.: NEVER TRUE

## 2025-06-16 ASSESSMENT — PATIENT HEALTH QUESTIONNAIRE - PHQ9
SUM OF ALL RESPONSES TO PHQ QUESTIONS 1-9: 0
SUM OF ALL RESPONSES TO PHQ QUESTIONS 1-9: 0
2. FEELING DOWN, DEPRESSED OR HOPELESS: NOT AT ALL
SUM OF ALL RESPONSES TO PHQ QUESTIONS 1-9: 0
1. LITTLE INTEREST OR PLEASURE IN DOING THINGS: NOT AT ALL
SUM OF ALL RESPONSES TO PHQ QUESTIONS 1-9: 0

## 2025-06-16 NOTE — PROGRESS NOTES
Subjective: (As above and below)     Chief Complaint   Patient presents with    3 Month Follow-Up     Melissa Guidry is a 34 y.o. year old female who presents for     Weight: was previosly rx'd phentermine but it made her feel jittery and BP has been sporadically elevated      PCOS  Pre-DM;     WILLIE: taking iron some of the time; causing stomach upset    Elevated BP; diet has been high in sodium recently    BP Readings from Last 3 Encounters:   06/16/25 (!) 151/88   03/13/25 139/84             Reviewed PmHx, RxHx, FmHx, SocHx, AllgHx and updated in chart.  Family History   Problem Relation Age of Onset    Diabetes Mother     Hypertension Mother     Heart Attack Mother     Hypertension Sister     Hypertension Maternal Grandmother     No Known Problems Daughter        Past Medical History:   Diagnosis Date    Anemia since birth    GBS (group B Streptococcus carrier), +RV culture, currently pregnant 01/20/2016    Gestational hypertension     Headache     migraines    Hypertension since birth    PROM (premature rupture of membranes) 07/25/2018      Social History     Socioeconomic History    Marital status:      Spouse name: None    Number of children: None    Years of education: None    Highest education level: None   Tobacco Use    Smoking status: Never    Smokeless tobacco: Never   Substance and Sexual Activity    Alcohol use: No    Drug use: No     Social Drivers of Health     Food Insecurity: No Food Insecurity (6/16/2025)    Hunger Vital Sign     Worried About Running Out of Food in the Last Year: Never true     Ran Out of Food in the Last Year: Never true   Transportation Needs: No Transportation Needs (6/16/2025)    PRAPARE - Transportation     Lack of Transportation (Medical): No     Lack of Transportation (Non-Medical): No   Physical Activity: Insufficiently Active (3/10/2025)    Exercise Vital Sign     Days of Exercise per Week: 2 days     Minutes of Exercise per Session: 30 min   Housing

## 2025-06-16 NOTE — PROGRESS NOTES
Pt is here for   Chief Complaint   Patient presents with    3 Month Follow-Up     Have you been to the ER, urgent care clinic since your last visit?  Hospitalized since your last visit?   NO    Have you seen or consulted any other health care providers outside our system since your last visit?   NO     “Have you had a pap smear?”    NO    Date of last Cervical Cancer screen (HPV or PAP): 4/14/2015

## 2025-06-16 NOTE — PATIENT INSTRUCTIONS
I will be out of town from 6/21/25 - 07/01/25. There may be a delay in [employer] message responses as well as lab results.  If there is anything serious, my coworkers who are covering my work will be notified.

## 2025-06-18 ENCOUNTER — CLINICAL DOCUMENTATION (OUTPATIENT)
Facility: CLINIC | Age: 35
End: 2025-06-18

## 2025-06-18 NOTE — PROGRESS NOTES
Close reason: Other  Payer: [facility] [employer] - Managed Medicaid Case ID: [id number]  Note [facility] payer: [facility] error with your request. Could not find matching patient.. Please reach out to the payer for assistance.

## 2025-06-19 ENCOUNTER — CLINICAL DOCUMENTATION (OUTPATIENT)
Facility: CLINIC | Age: 35
End: 2025-06-19

## 2025-06-19 ENCOUNTER — TELEPHONE (OUTPATIENT)
Facility: CLINIC | Age: 35
End: 2025-06-19

## 2025-06-19 NOTE — PROGRESS NOTES
Close reason: Other  Payer: [facility] [employer] - Managed Medicaid Case ID: [id number]  [facility]: [facility] with [facility]. Could not find matching patient.. Please reach out to the payer for assistance.

## 2025-06-20 ENCOUNTER — TELEPHONE (OUTPATIENT)
Facility: CLINIC | Age: 35
End: 2025-06-20

## 2025-06-20 DIAGNOSIS — E66.01 OBESITY, MORBID, BMI 40.0-49.9 (HCC): ICD-10-CM

## 2025-06-20 RX ORDER — PHENTERMINE HYDROCHLORIDE 15 MG/1
CAPSULE ORAL
Qty: 30 CAPSULE | OUTPATIENT
Start: 2025-06-20

## 2025-06-20 NOTE — TELEPHONE ENCOUNTER
Patient wants to know since the ozempic was denied should she continue to take the     phentermine 15 MG capsule

## 2025-07-03 ENCOUNTER — PATIENT MESSAGE (OUTPATIENT)
Facility: CLINIC | Age: 35
End: 2025-07-03

## 2025-07-08 DIAGNOSIS — R73.03 PREDIABETES: Primary | ICD-10-CM

## 2025-07-08 DIAGNOSIS — E66.01 OBESITY, MORBID, BMI 40.0-49.9 (HCC): ICD-10-CM

## 2025-07-08 DIAGNOSIS — E28.2 PCOS (POLYCYSTIC OVARIAN SYNDROME): ICD-10-CM

## 2025-07-17 ENCOUNTER — CLINICAL DOCUMENTATION (OUTPATIENT)
Facility: CLINIC | Age: 35
End: 2025-07-17

## 2025-07-17 NOTE — PROGRESS NOTES
The payer has not yet made a decision on the prior authorization request. For Semaglutide-Weight Management (WEGOVY) 0.25 MG/0.5ML SOAJ SC injection

## 2025-07-21 ENCOUNTER — OFFICE VISIT (OUTPATIENT)
Facility: CLINIC | Age: 35
End: 2025-07-21
Payer: MEDICAID

## 2025-07-21 VITALS
BODY MASS INDEX: 42.99 KG/M2 | HEART RATE: 71 BPM | TEMPERATURE: 98.5 F | WEIGHT: 258 LBS | DIASTOLIC BLOOD PRESSURE: 78 MMHG | SYSTOLIC BLOOD PRESSURE: 125 MMHG | RESPIRATION RATE: 18 BRPM | OXYGEN SATURATION: 100 % | HEIGHT: 65 IN

## 2025-07-21 DIAGNOSIS — E66.01 OBESITY, MORBID, BMI 40.0-49.9 (HCC): Primary | ICD-10-CM

## 2025-07-21 PROCEDURE — 99213 OFFICE O/P EST LOW 20 MIN: CPT | Performed by: NURSE PRACTITIONER

## 2025-07-21 RX ORDER — PHENTERMINE HYDROCHLORIDE 15 MG/1
15 CAPSULE ORAL EVERY MORNING
Qty: 30 CAPSULE | Refills: 0 | Status: SHIPPED | OUTPATIENT
Start: 2025-07-21 | End: 2025-08-20

## 2025-07-21 NOTE — PROGRESS NOTES
Melissa Guidry is a 34 y.o. female  Have you been to the ER, urgent care clinic since your last visit?  Hospitalized since your last visit?   NO    Have you seen or consulted any other health care providers outside our system since your last visit?   NO     “Have you had a pap smear?”    NO    Date of last Cervical Cancer screen (HPV or PAP): 4/14/2015            Chief Complaint   Patient presents with    Discuss Medications    Weight Management     /78 (BP Site: Right Upper Arm, Patient Position: Sitting, BP Cuff Size: Medium Adult)   Pulse 71   Temp 98.5 °F (36.9 °C) (Temporal)   Resp 18   Ht 1.651 m (5' 5\")   Wt 117 kg (258 lb)   LMP 07/18/2025   SpO2 100%   BMI 42.93 kg/m²

## 2025-07-21 NOTE — PROGRESS NOTES
Subjective: (As above and below)     Chief Complaint   Patient presents with    Discuss Medications    Weight Management     Melissa Guidry is a 34 y.o. year old female who presents for     Weight  BP was previously elevated which is why phentermine was deferred  She has a BP cuff at home    Wt Readings from Last 3 Encounters:   07/21/25 117 kg (258 lb)   06/16/25 115.7 kg (255 lb)   03/13/25 115.8 kg (255 lb 4.8 oz)       BP Readings from Last 3 Encounters:   07/21/25 125/78   06/16/25 (!) 151/88   03/13/25 139/84           Reviewed PmHx, RxHx, FmHx, SocHx, AllgHx and updated in chart.  Family History   Problem Relation Age of Onset    Diabetes Mother     Hypertension Mother     Heart Attack Mother     Hypertension Sister     Hypertension Maternal Grandmother     No Known Problems Daughter        Past Medical History:   Diagnosis Date    Anemia since birth    GBS (group B Streptococcus carrier), +RV culture, currently pregnant 01/20/2016    Gestational hypertension     Headache     migraines    Hypertension since birth    PROM (premature rupture of membranes) 07/25/2018      Social History     Socioeconomic History    Marital status:      Spouse name: None    Number of children: None    Years of education: None    Highest education level: None   Tobacco Use    Smoking status: Never    Smokeless tobacco: Never   Substance and Sexual Activity    Alcohol use: No    Drug use: No     Social Drivers of Health     Food Insecurity: No Food Insecurity (6/16/2025)    Hunger Vital Sign     Worried About Running Out of Food in the Last Year: Never true     Ran Out of Food in the Last Year: Never true   Transportation Needs: No Transportation Needs (6/16/2025)    PRAPARE - Transportation     Lack of Transportation (Medical): No     Lack of Transportation (Non-Medical): No   Physical Activity: Insufficiently Active (3/10/2025)    Exercise Vital Sign     Days of Exercise per Week: 2 days     Minutes of Exercise per

## 2025-08-15 DIAGNOSIS — E66.01 OBESITY, MORBID, BMI 40.0-49.9 (HCC): ICD-10-CM

## 2025-08-15 DIAGNOSIS — R73.03 PREDIABETES: ICD-10-CM

## 2025-08-15 DIAGNOSIS — E28.2 PCOS (POLYCYSTIC OVARIAN SYNDROME): ICD-10-CM

## (undated) DEVICE — SUTURE PDS II SZ 0 L36IN ABSRB VLT L40MM CT 1/2 CIR Z358T

## (undated) DEVICE — ROYALSILK SURGICAL GOWN, L: Brand: CONVERTORS

## (undated) DEVICE — SOLUTION IV 1000ML 0.9% SOD CHL

## (undated) DEVICE — TRAXI PANNICULUS RETRACTOR WITH RETENTUS TECHNOLOGY (BMI 30-50): Brand: TRAXI® PANNICULUS RETRACTOR

## (undated) DEVICE — SUTURE MCRYL SZ 0 L36IN ABSRB UD L36MM CT-1 1/2 CIR Y946H

## (undated) DEVICE — MEDI-VAC NON-CONDUCTIVE SUCTION TUBING: Brand: CARDINAL HEALTH

## (undated) DEVICE — SOLUTION IRRIG 1000ML H2O STRL BLT

## (undated) DEVICE — SUTURE MCRYL SZ 4-0 L27IN ABSRB UD L24MM PS-1 3/8 CIR PRIM Y935H

## (undated) DEVICE — STERILE POLYISOPRENE POWDER-FREE SURGICAL GLOVES: Brand: PROTEXIS

## (undated) DEVICE — SUTURE VCRL SZ 3-0 L36IN ABSRB VLT CT L40MM 1/2 CIR J356H

## (undated) DEVICE — PACK PROCEDURE SURG C SECT KT SMH

## (undated) DEVICE — POOLE SUCTION INSTRUMENT WITH REMOVABLE SHEATH: Brand: POOLE

## (undated) DEVICE — TIP CLEANER: Brand: VALLEYLAB

## (undated) DEVICE — ROYAL SILK SURGICAL GOWN, XXL: Brand: CONVERTORS

## (undated) DEVICE — SPONGE: LAP 18X18 W  200/CS: Brand: MEDICAL ACTION INDUSTRIES

## (undated) DEVICE — (D)PREP SKN CHLRAPRP APPL 26ML -- CONVERT TO ITEM 371833

## (undated) DEVICE — KIT DSG LRG NEG PRSS WND THER VAC GRANUFOAM

## (undated) DEVICE — REM POLYHESIVE ADULT PATIENT RETURN ELECTRODE: Brand: VALLEYLAB

## (undated) DEVICE — DEVON™ KNEE AND BODY STRAP 60" X 3" (1.5 M X 7.6 CM): Brand: DEVON

## (undated) DEVICE — AGENT HEMSTAT 3GM PURIFIED PLNT STARCH PWD ABSRB ARISTA AH

## (undated) DEVICE — 3000CC GUARDIAN II: Brand: GUARDIAN

## (undated) DEVICE — STERILE POLYISOPRENE POWDER-FREE SURGICAL GLOVES WITH EMOLLIENT COATING: Brand: PROTEXIS

## (undated) DEVICE — SUTURE MCRYL SZ 1 L36IN ABSRB VLT CT-1 L36MM 1/2 CIR TAPR Y347H

## (undated) DEVICE — COVERALL PREM SMS 2XL KNIT --

## (undated) DEVICE — ANESTHESIA TRAY SPNL W/O NDL PENCAN

## (undated) DEVICE — CANISTER WND VAC ASST CLSR --

## (undated) DEVICE — CATH FOLEY 16F LUBRI-SIL IC --

## (undated) DEVICE — LIGHT HANDLE: Brand: DEVON

## (undated) DEVICE — SOLIDIFIER MEDC 1200ML -- CONVERT TO 356117

## (undated) DEVICE — Device: Brand: PORTEX

## (undated) DEVICE — SUTURE VCRL SZ 0 L36IN ABSRB VLT L40MM CT 1/2 CIR J358H

## (undated) DEVICE — DRAPE FLD WRM W44XL66IN C6L FOR INTRATEMP SYS THERMABASIN

## (undated) DEVICE — PENCIL ES L3M BTTN SWCH S STL HEX LOK BLDE ELECTRD HOLSTER

## (undated) DEVICE — DRSG AQUACEL SURG 3.5 X 10IN -- CONVERT TO ITEM 370183

## (undated) DEVICE — KENDALL SCD EXPRESS SLEEVES, KNEE LENGTH, MEDIUM: Brand: KENDALL SCD

## (undated) DEVICE — (D)STRIP SKN CLSR 0.5X4IN WHT --

## (undated) DEVICE — SUTURE VCRL SZ 2-0 L36IN ABSRB VLT L36MM CT-1 1/2 CIR J345H